# Patient Record
Sex: MALE | Race: WHITE | NOT HISPANIC OR LATINO | Employment: OTHER | URBAN - METROPOLITAN AREA
[De-identification: names, ages, dates, MRNs, and addresses within clinical notes are randomized per-mention and may not be internally consistent; named-entity substitution may affect disease eponyms.]

---

## 2024-05-31 ENCOUNTER — APPOINTMENT (OUTPATIENT)
Dept: CARDIOLOGY | Facility: HOSPITAL | Age: 89
DRG: 190 | End: 2024-05-31
Payer: MEDICARE

## 2024-05-31 ENCOUNTER — HOSPITAL ENCOUNTER (INPATIENT)
Facility: HOSPITAL | Age: 89
LOS: 2 days | Discharge: HOME | DRG: 190 | End: 2024-06-02
Attending: EMERGENCY MEDICINE | Admitting: INTERNAL MEDICINE
Payer: MEDICARE

## 2024-05-31 ENCOUNTER — APPOINTMENT (OUTPATIENT)
Dept: RADIOLOGY | Facility: HOSPITAL | Age: 89
DRG: 190 | End: 2024-05-31
Payer: MEDICARE

## 2024-05-31 DIAGNOSIS — R79.89 ELEVATED BRAIN NATRIURETIC PEPTIDE (BNP) LEVEL: ICD-10-CM

## 2024-05-31 DIAGNOSIS — J96.01 ACUTE RESPIRATORY FAILURE WITH HYPOXIA AND HYPERCAPNIA (MULTI): ICD-10-CM

## 2024-05-31 DIAGNOSIS — J96.02 ACUTE RESPIRATORY FAILURE WITH HYPOXIA AND HYPERCAPNIA (MULTI): ICD-10-CM

## 2024-05-31 DIAGNOSIS — R94.31 ABNORMAL ELECTROCARDIOGRAM (ECG) (EKG): ICD-10-CM

## 2024-05-31 DIAGNOSIS — J44.1 COPD EXACERBATION (MULTI): Primary | ICD-10-CM

## 2024-05-31 LAB
ALBUMIN SERPL BCP-MCNC: 3.6 G/DL (ref 3.4–5)
ALP SERPL-CCNC: 112 U/L (ref 33–136)
ALT SERPL W P-5'-P-CCNC: 11 U/L (ref 10–52)
ANION GAP BLDV CALCULATED.4IONS-SCNC: 12 MMOL/L (ref 10–25)
ANION GAP BLDV CALCULATED.4IONS-SCNC: 12 MMOL/L (ref 10–25)
ANION GAP SERPL CALC-SCNC: 12 MMOL/L (ref 10–20)
AST SERPL W P-5'-P-CCNC: 18 U/L (ref 9–39)
BASE EXCESS BLDV CALC-SCNC: 5.7 MMOL/L (ref -2–3)
BASE EXCESS BLDV CALC-SCNC: 6.1 MMOL/L (ref -2–3)
BASOPHILS # BLD AUTO: 0.02 X10*3/UL (ref 0–0.1)
BASOPHILS NFR BLD AUTO: 0.3 %
BILIRUB SERPL-MCNC: 0.4 MG/DL (ref 0–1.2)
BNP SERPL-MCNC: 739 PG/ML (ref 0–99)
BODY TEMPERATURE: 37 DEGREES CELSIUS
BODY TEMPERATURE: 37 DEGREES CELSIUS
BUN SERPL-MCNC: 49 MG/DL (ref 6–23)
CA-I BLDV-SCNC: 1.18 MMOL/L (ref 1.1–1.33)
CA-I BLDV-SCNC: 1.19 MMOL/L (ref 1.1–1.33)
CALCIUM SERPL-MCNC: 8.9 MG/DL (ref 8.6–10.3)
CARDIAC TROPONIN I PNL SERPL HS: 350 NG/L (ref 0–20)
CARDIAC TROPONIN I PNL SERPL HS: 36 NG/L (ref 0–20)
CARDIAC TROPONIN I PNL SERPL HS: 48 NG/L (ref 0–20)
CHLORIDE BLDV-SCNC: 96 MMOL/L (ref 98–107)
CHLORIDE BLDV-SCNC: 97 MMOL/L (ref 98–107)
CHLORIDE SERPL-SCNC: 99 MMOL/L (ref 98–107)
CO2 SERPL-SCNC: 33 MMOL/L (ref 21–32)
CREAT SERPL-MCNC: 1.59 MG/DL (ref 0.5–1.3)
CRITICAL CALL TIME: 218
CRITICAL CALL TIME: 50
CRITICAL CALLED BY: ABNORMAL
CRITICAL CALLED BY: ABNORMAL
CRITICAL CALLED TO: ABNORMAL
CRITICAL CALLED TO: ABNORMAL
CRITICAL READ BACK: ABNORMAL
CRITICAL READ BACK: ABNORMAL
EGFRCR SERPLBLD CKD-EPI 2021: 41 ML/MIN/1.73M*2
EOSINOPHIL # BLD AUTO: 0.56 X10*3/UL (ref 0–0.4)
EOSINOPHIL NFR BLD AUTO: 7.3 %
ERYTHROCYTE [DISTWIDTH] IN BLOOD BY AUTOMATED COUNT: 15.3 % (ref 11.5–14.5)
GLUCOSE BLDV-MCNC: 125 MG/DL (ref 74–99)
GLUCOSE BLDV-MCNC: 141 MG/DL (ref 74–99)
GLUCOSE SERPL-MCNC: 137 MG/DL (ref 74–99)
HCO3 BLDV-SCNC: 34.3 MMOL/L (ref 22–26)
HCO3 BLDV-SCNC: 35.5 MMOL/L (ref 22–26)
HCT VFR BLD AUTO: 36.7 % (ref 41–52)
HCT VFR BLD EST: 32 % (ref 41–52)
HCT VFR BLD EST: 35 % (ref 41–52)
HGB BLD-MCNC: 11.7 G/DL (ref 13.5–17.5)
HGB BLDV-MCNC: 10.8 G/DL (ref 13.5–17.5)
HGB BLDV-MCNC: 11.5 G/DL (ref 13.5–17.5)
IMM GRANULOCYTES # BLD AUTO: 0.02 X10*3/UL (ref 0–0.5)
IMM GRANULOCYTES NFR BLD AUTO: 0.3 % (ref 0–0.9)
INHALED O2 CONCENTRATION: 40 %
INHALED O2 CONCENTRATION: 50 %
LACTATE BLDV-SCNC: 1.3 MMOL/L (ref 0.4–2)
LACTATE BLDV-SCNC: 1.3 MMOL/L (ref 0.4–2)
LYMPHOCYTES # BLD AUTO: 2.11 X10*3/UL (ref 0.8–3)
LYMPHOCYTES NFR BLD AUTO: 27.6 %
MAGNESIUM SERPL-MCNC: 2.21 MG/DL (ref 1.6–2.4)
MCH RBC QN AUTO: 31.4 PG (ref 26–34)
MCHC RBC AUTO-ENTMCNC: 31.9 G/DL (ref 32–36)
MCV RBC AUTO: 98 FL (ref 80–100)
MONOCYTES # BLD AUTO: 0.91 X10*3/UL (ref 0.05–0.8)
MONOCYTES NFR BLD AUTO: 11.9 %
NEUTROPHILS # BLD AUTO: 4.02 X10*3/UL (ref 1.6–5.5)
NEUTROPHILS NFR BLD AUTO: 52.6 %
NRBC BLD-RTO: 0 /100 WBCS (ref 0–0)
OXYHGB MFR BLDV: 23 % (ref 45–75)
OXYHGB MFR BLDV: 34.5 % (ref 45–75)
PCO2 BLDV: 73 MM HG (ref 41–51)
PCO2 BLDV: 79 MM HG (ref 41–51)
PH BLDV: 7.26 PH (ref 7.33–7.43)
PH BLDV: 7.28 PH (ref 7.33–7.43)
PLATELET # BLD AUTO: 151 X10*3/UL (ref 150–450)
PO2 BLDV: 20 MM HG (ref 35–45)
PO2 BLDV: 26 MM HG (ref 35–45)
POTASSIUM BLDV-SCNC: 4.6 MMOL/L (ref 3.5–5.3)
POTASSIUM BLDV-SCNC: 4.6 MMOL/L (ref 3.5–5.3)
POTASSIUM SERPL-SCNC: 4.4 MMOL/L (ref 3.5–5.3)
PROT SERPL-MCNC: 6.7 G/DL (ref 6.4–8.2)
RBC # BLD AUTO: 3.73 X10*6/UL (ref 4.5–5.9)
SAO2 % BLDV: 23 % (ref 45–75)
SAO2 % BLDV: 35 % (ref 45–75)
SARS-COV-2 RNA RESP QL NAA+PROBE: NOT DETECTED
SODIUM BLDV-SCNC: 139 MMOL/L (ref 136–145)
SODIUM BLDV-SCNC: 139 MMOL/L (ref 136–145)
SODIUM SERPL-SCNC: 140 MMOL/L (ref 136–145)
WBC # BLD AUTO: 7.6 X10*3/UL (ref 4.4–11.3)

## 2024-05-31 PROCEDURE — 94640 AIRWAY INHALATION TREATMENT: CPT

## 2024-05-31 PROCEDURE — 93005 ELECTROCARDIOGRAM TRACING: CPT

## 2024-05-31 PROCEDURE — 71045 X-RAY EXAM CHEST 1 VIEW: CPT | Mod: FOREIGN READ | Performed by: RADIOLOGY

## 2024-05-31 PROCEDURE — 94660 CPAP INITIATION&MGMT: CPT

## 2024-05-31 PROCEDURE — 36415 COLL VENOUS BLD VENIPUNCTURE: CPT | Performed by: EMERGENCY MEDICINE

## 2024-05-31 PROCEDURE — 2500000002 HC RX 250 W HCPCS SELF ADMINISTERED DRUGS (ALT 637 FOR MEDICARE OP, ALT 636 FOR OP/ED): Performed by: EMERGENCY MEDICINE

## 2024-05-31 PROCEDURE — 99291 CRITICAL CARE FIRST HOUR: CPT | Mod: 25 | Performed by: EMERGENCY MEDICINE

## 2024-05-31 PROCEDURE — 36415 COLL VENOUS BLD VENIPUNCTURE: CPT

## 2024-05-31 PROCEDURE — 71045 X-RAY EXAM CHEST 1 VIEW: CPT

## 2024-05-31 PROCEDURE — 2500000002 HC RX 250 W HCPCS SELF ADMINISTERED DRUGS (ALT 637 FOR MEDICARE OP, ALT 636 FOR OP/ED)

## 2024-05-31 PROCEDURE — 96365 THER/PROPH/DIAG IV INF INIT: CPT

## 2024-05-31 PROCEDURE — 84132 ASSAY OF SERUM POTASSIUM: CPT | Mod: 91 | Performed by: EMERGENCY MEDICINE

## 2024-05-31 PROCEDURE — 2500000004 HC RX 250 GENERAL PHARMACY W/ HCPCS (ALT 636 FOR OP/ED)

## 2024-05-31 PROCEDURE — 2500000002 HC RX 250 W HCPCS SELF ADMINISTERED DRUGS (ALT 637 FOR MEDICARE OP, ALT 636 FOR OP/ED): Mod: MUE

## 2024-05-31 PROCEDURE — 84484 ASSAY OF TROPONIN QUANT: CPT | Performed by: EMERGENCY MEDICINE

## 2024-05-31 PROCEDURE — 84484 ASSAY OF TROPONIN QUANT: CPT

## 2024-05-31 PROCEDURE — 96367 TX/PROPH/DG ADDL SEQ IV INF: CPT

## 2024-05-31 PROCEDURE — 83880 ASSAY OF NATRIURETIC PEPTIDE: CPT | Performed by: EMERGENCY MEDICINE

## 2024-05-31 PROCEDURE — 2500000001 HC RX 250 WO HCPCS SELF ADMINISTERED DRUGS (ALT 637 FOR MEDICARE OP)

## 2024-05-31 PROCEDURE — 2060000001 HC INTERMEDIATE ICU ROOM DAILY

## 2024-05-31 PROCEDURE — 87635 SARS-COV-2 COVID-19 AMP PRB: CPT | Performed by: EMERGENCY MEDICINE

## 2024-05-31 PROCEDURE — 83735 ASSAY OF MAGNESIUM: CPT | Performed by: EMERGENCY MEDICINE

## 2024-05-31 PROCEDURE — 80053 COMPREHEN METABOLIC PANEL: CPT | Performed by: EMERGENCY MEDICINE

## 2024-05-31 PROCEDURE — 2500000004 HC RX 250 GENERAL PHARMACY W/ HCPCS (ALT 636 FOR OP/ED): Performed by: EMERGENCY MEDICINE

## 2024-05-31 PROCEDURE — 85025 COMPLETE CBC W/AUTO DIFF WBC: CPT | Performed by: EMERGENCY MEDICINE

## 2024-05-31 RX ORDER — CEFTRIAXONE 1 G/50ML
1 INJECTION, SOLUTION INTRAVENOUS ONCE
Status: COMPLETED | OUTPATIENT
Start: 2024-05-31 | End: 2024-05-31

## 2024-05-31 RX ORDER — ASPIRIN 81 MG/1
81 TABLET ORAL DAILY
Status: DISCONTINUED | OUTPATIENT
Start: 2024-05-31 | End: 2024-06-02 | Stop reason: HOSPADM

## 2024-05-31 RX ORDER — ALBUTEROL SULFATE 90 UG/1
2 AEROSOL, METERED RESPIRATORY (INHALATION) EVERY 6 HOURS PRN
COMMUNITY
Start: 2023-08-01

## 2024-05-31 RX ORDER — ASPIRIN 81 MG/1
1 TABLET ORAL DAILY
COMMUNITY

## 2024-05-31 RX ORDER — ATORVASTATIN CALCIUM 40 MG/1
40 TABLET, FILM COATED ORAL NIGHTLY
Status: DISCONTINUED | OUTPATIENT
Start: 2024-05-31 | End: 2024-06-02 | Stop reason: HOSPADM

## 2024-05-31 RX ORDER — POLYETHYLENE GLYCOL 3350 17 G/17G
17 POWDER, FOR SOLUTION ORAL DAILY
Status: DISCONTINUED | OUTPATIENT
Start: 2024-05-31 | End: 2024-06-02 | Stop reason: HOSPADM

## 2024-05-31 RX ORDER — METOPROLOL TARTRATE 25 MG/1
25 TABLET, FILM COATED ORAL 2 TIMES DAILY
Status: DISCONTINUED | OUTPATIENT
Start: 2024-05-31 | End: 2024-06-02 | Stop reason: HOSPADM

## 2024-05-31 RX ORDER — PETROLATUM 420 MG/G
OINTMENT TOPICAL DAILY
Status: DISCONTINUED | OUTPATIENT
Start: 2024-05-31 | End: 2024-06-02 | Stop reason: HOSPADM

## 2024-05-31 RX ORDER — MAGNESIUM SULFATE HEPTAHYDRATE 40 MG/ML
2 INJECTION, SOLUTION INTRAVENOUS ONCE
Status: COMPLETED | OUTPATIENT
Start: 2024-05-31 | End: 2024-05-31

## 2024-05-31 RX ORDER — ASPIRIN 325 MG
50000 TABLET, DELAYED RELEASE (ENTERIC COATED) ORAL
COMMUNITY
Start: 2023-12-28

## 2024-05-31 RX ORDER — IPRATROPIUM BROMIDE AND ALBUTEROL SULFATE 2.5; .5 MG/3ML; MG/3ML
3 SOLUTION RESPIRATORY (INHALATION)
Status: DISCONTINUED | OUTPATIENT
Start: 2024-05-31 | End: 2024-06-01

## 2024-05-31 RX ORDER — IPRATROPIUM BROMIDE AND ALBUTEROL SULFATE 2.5; .5 MG/3ML; MG/3ML
3 SOLUTION RESPIRATORY (INHALATION) ONCE
Status: COMPLETED | OUTPATIENT
Start: 2024-05-31 | End: 2024-05-31

## 2024-05-31 RX ORDER — GUAIFENESIN 100 MG/5ML
400 SOLUTION ORAL EVERY 4 HOURS PRN
Status: DISCONTINUED | OUTPATIENT
Start: 2024-05-31 | End: 2024-06-02 | Stop reason: HOSPADM

## 2024-05-31 RX ORDER — FLUTICASONE PROPIONATE 50 MCG
1 SPRAY, SUSPENSION (ML) NASAL DAILY PRN
Status: DISCONTINUED | OUTPATIENT
Start: 2024-05-31 | End: 2024-06-02 | Stop reason: HOSPADM

## 2024-05-31 RX ORDER — HEPARIN SODIUM 5000 [USP'U]/ML
5000 INJECTION, SOLUTION INTRAVENOUS; SUBCUTANEOUS EVERY 8 HOURS
Status: DISCONTINUED | OUTPATIENT
Start: 2024-05-31 | End: 2024-06-02 | Stop reason: HOSPADM

## 2024-05-31 RX ORDER — ALBUTEROL SULFATE 0.83 MG/ML
2.5 SOLUTION RESPIRATORY (INHALATION) ONCE
Status: COMPLETED | OUTPATIENT
Start: 2024-05-31 | End: 2024-05-31

## 2024-05-31 RX ORDER — L. ACIDOPHILUS/L.BULGARICUS 1MM CELL
1 TABLET ORAL 2 TIMES DAILY
Status: DISCONTINUED | OUTPATIENT
Start: 2024-05-31 | End: 2024-06-02 | Stop reason: HOSPADM

## 2024-05-31 RX ORDER — ALBUTEROL SULFATE 90 UG/1
2 AEROSOL, METERED RESPIRATORY (INHALATION) EVERY 6 HOURS PRN
Status: CANCELLED | OUTPATIENT
Start: 2024-05-31

## 2024-05-31 RX ORDER — ALBUTEROL SULFATE 0.83 MG/ML
2.5 SOLUTION RESPIRATORY (INHALATION) EVERY 4 HOURS PRN
Status: CANCELLED | OUTPATIENT
Start: 2024-05-31

## 2024-05-31 RX ORDER — METOPROLOL TARTRATE 25 MG/1
1 TABLET, FILM COATED ORAL 2 TIMES DAILY
COMMUNITY
Start: 2024-01-29

## 2024-05-31 RX ORDER — FLUTICASONE PROPIONATE 50 MCG
1 SPRAY, SUSPENSION (ML) NASAL DAILY PRN
COMMUNITY

## 2024-05-31 RX ORDER — GUAIFENESIN 400 MG/1
400 TABLET ORAL EVERY 4 HOURS PRN
COMMUNITY
Start: 2024-04-09

## 2024-05-31 RX ORDER — ALLOPURINOL 100 MG/1
100 TABLET ORAL DAILY
Status: DISCONTINUED | OUTPATIENT
Start: 2024-05-31 | End: 2024-06-02 | Stop reason: HOSPADM

## 2024-05-31 RX ORDER — ATORVASTATIN CALCIUM 80 MG/1
0.5 TABLET, FILM COATED ORAL NIGHTLY
COMMUNITY
Start: 2023-06-28

## 2024-05-31 RX ORDER — ACETAMINOPHEN 325 MG/1
650 TABLET ORAL EVERY 4 HOURS PRN
Status: DISCONTINUED | OUTPATIENT
Start: 2024-05-31 | End: 2024-06-02 | Stop reason: HOSPADM

## 2024-05-31 RX ORDER — TORSEMIDE 20 MG/1
20 TABLET ORAL DAILY PRN
Status: DISCONTINUED | OUTPATIENT
Start: 2024-05-31 | End: 2024-06-02 | Stop reason: HOSPADM

## 2024-05-31 RX ORDER — ALBUTEROL SULFATE 0.83 MG/ML
2.5 SOLUTION RESPIRATORY (INHALATION) EVERY 4 HOURS PRN
Status: DISCONTINUED | OUTPATIENT
Start: 2024-05-31 | End: 2024-06-02 | Stop reason: HOSPADM

## 2024-05-31 RX ORDER — TORSEMIDE 20 MG/1
1-2 TABLET ORAL DAILY PRN
COMMUNITY
Start: 2023-12-11

## 2024-05-31 RX ORDER — ALLOPURINOL 300 MG/1
1 TABLET ORAL DAILY
COMMUNITY
Start: 2022-09-27

## 2024-05-31 RX ADMIN — DOXYCYCLINE 100 MG: 100 INJECTION, POWDER, LYOPHILIZED, FOR SOLUTION INTRAVENOUS at 03:35

## 2024-05-31 RX ADMIN — METHYLPREDNISOLONE SODIUM SUCCINATE 40 MG: 40 INJECTION, POWDER, FOR SOLUTION INTRAMUSCULAR; INTRAVENOUS at 12:42

## 2024-05-31 RX ADMIN — METOPROLOL TARTRATE 25 MG: 25 TABLET, FILM COATED ORAL at 20:56

## 2024-05-31 RX ADMIN — Medication 1 TABLET: at 12:42

## 2024-05-31 RX ADMIN — ATORVASTATIN CALCIUM 40 MG: 40 TABLET, FILM COATED ORAL at 20:56

## 2024-05-31 RX ADMIN — ALLOPURINOL 100 MG: 100 TABLET ORAL at 13:29

## 2024-05-31 RX ADMIN — AZITHROMYCIN MONOHYDRATE 500 MG: 500 INJECTION, POWDER, LYOPHILIZED, FOR SOLUTION INTRAVENOUS at 12:42

## 2024-05-31 RX ADMIN — WHITE PETROLATUM: 1.75 OINTMENT TOPICAL at 13:29

## 2024-05-31 RX ADMIN — IPRATROPIUM BROMIDE AND ALBUTEROL SULFATE 3 ML: .5; 3 SOLUTION RESPIRATORY (INHALATION) at 19:15

## 2024-05-31 RX ADMIN — METOPROLOL TARTRATE 25 MG: 25 TABLET, FILM COATED ORAL at 12:42

## 2024-05-31 RX ADMIN — ALBUTEROL SULFATE 2.5 MG: 2.5 SOLUTION RESPIRATORY (INHALATION) at 00:57

## 2024-05-31 RX ADMIN — HEPARIN SODIUM 5000 UNITS: 5000 INJECTION INTRAVENOUS; SUBCUTANEOUS at 12:42

## 2024-05-31 RX ADMIN — METHYLPREDNISOLONE SODIUM SUCCINATE 40 MG: 40 INJECTION, POWDER, FOR SOLUTION INTRAMUSCULAR; INTRAVENOUS at 20:56

## 2024-05-31 RX ADMIN — CEFTRIAXONE SODIUM 1 G: 1 INJECTION, SOLUTION INTRAVENOUS at 02:54

## 2024-05-31 RX ADMIN — MAGNESIUM SULFATE HEPTAHYDRATE 2 G: 40 INJECTION, SOLUTION INTRAVENOUS at 01:00

## 2024-05-31 RX ADMIN — IPRATROPIUM BROMIDE AND ALBUTEROL SULFATE 3 ML: .5; 3 SOLUTION RESPIRATORY (INHALATION) at 12:35

## 2024-05-31 RX ADMIN — Medication 1 TABLET: at 20:56

## 2024-05-31 RX ADMIN — HEPARIN SODIUM 5000 UNITS: 5000 INJECTION INTRAVENOUS; SUBCUTANEOUS at 20:56

## 2024-05-31 RX ADMIN — IPRATROPIUM BROMIDE AND ALBUTEROL SULFATE 3 ML: .5; 3 SOLUTION RESPIRATORY (INHALATION) at 03:35

## 2024-05-31 RX ADMIN — ASPIRIN 81 MG: 81 TABLET, COATED ORAL at 12:42

## 2024-05-31 RX ADMIN — IPRATROPIUM BROMIDE AND ALBUTEROL SULFATE 3 ML: .5; 3 SOLUTION RESPIRATORY (INHALATION) at 00:57

## 2024-05-31 SDOH — SOCIAL STABILITY: SOCIAL INSECURITY: DO YOU FEEL ANYONE HAS EXPLOITED OR TAKEN ADVANTAGE OF YOU FINANCIALLY OR OF YOUR PERSONAL PROPERTY?: NO

## 2024-05-31 SDOH — SOCIAL STABILITY: SOCIAL INSECURITY: ARE YOU OR HAVE YOU BEEN THREATENED OR ABUSED PHYSICALLY, EMOTIONALLY, OR SEXUALLY BY ANYONE?: NO

## 2024-05-31 SDOH — SOCIAL STABILITY: SOCIAL INSECURITY: HAS ANYONE EVER THREATENED TO HURT YOUR FAMILY OR YOUR PETS?: NO

## 2024-05-31 SDOH — SOCIAL STABILITY: SOCIAL INSECURITY: ABUSE: ADULT

## 2024-05-31 SDOH — SOCIAL STABILITY: SOCIAL INSECURITY: HAVE YOU HAD THOUGHTS OF HARMING ANYONE ELSE?: NO

## 2024-05-31 SDOH — SOCIAL STABILITY: SOCIAL INSECURITY: WERE YOU ABLE TO COMPLETE ALL THE BEHAVIORAL HEALTH SCREENINGS?: YES

## 2024-05-31 SDOH — SOCIAL STABILITY: SOCIAL INSECURITY: DOES ANYONE TRY TO KEEP YOU FROM HAVING/CONTACTING OTHER FRIENDS OR DOING THINGS OUTSIDE YOUR HOME?: NO

## 2024-05-31 SDOH — SOCIAL STABILITY: SOCIAL INSECURITY: ARE THERE ANY APPARENT SIGNS OF INJURIES/BEHAVIORS THAT COULD BE RELATED TO ABUSE/NEGLECT?: NO

## 2024-05-31 SDOH — SOCIAL STABILITY: SOCIAL INSECURITY: HAVE YOU HAD ANY THOUGHTS OF HARMING ANYONE ELSE?: NO

## 2024-05-31 SDOH — SOCIAL STABILITY: SOCIAL INSECURITY: DO YOU FEEL UNSAFE GOING BACK TO THE PLACE WHERE YOU ARE LIVING?: NO

## 2024-05-31 ASSESSMENT — ACTIVITIES OF DAILY LIVING (ADL)
ASSISTIVE_DEVICE: WALKER;CANE
GROOMING: INDEPENDENT
LACK_OF_TRANSPORTATION: NO
HEARING - LEFT EAR: FUNCTIONAL
TOILETING: NEEDS ASSISTANCE
WALKS IN HOME: NEEDS ASSISTANCE
ADEQUATE_TO_COMPLETE_ADL: YES
BATHING: NEEDS ASSISTANCE
DRESSING YOURSELF: NEEDS ASSISTANCE
HEARING - RIGHT EAR: FUNCTIONAL
PATIENT'S MEMORY ADEQUATE TO SAFELY COMPLETE DAILY ACTIVITIES?: YES
FEEDING YOURSELF: INDEPENDENT
JUDGMENT_ADEQUATE_SAFELY_COMPLETE_DAILY_ACTIVITIES: YES

## 2024-05-31 ASSESSMENT — LIFESTYLE VARIABLES
HOW OFTEN DO YOU HAVE 6 OR MORE DRINKS ON ONE OCCASION: NEVER
HOW MANY STANDARD DRINKS CONTAINING ALCOHOL DO YOU HAVE ON A TYPICAL DAY: PATIENT DOES NOT DRINK
EVER HAD A DRINK FIRST THING IN THE MORNING TO STEADY YOUR NERVES TO GET RID OF A HANGOVER: NO
TOTAL SCORE: 0
AUDIT-C TOTAL SCORE: 0
PRESCIPTION_ABUSE_PAST_12_MONTHS: NO
SKIP TO QUESTIONS 9-10: 1
EVER FELT BAD OR GUILTY ABOUT YOUR DRINKING: NO
HOW OFTEN DO YOU HAVE A DRINK CONTAINING ALCOHOL: NEVER
HAVE PEOPLE ANNOYED YOU BY CRITICIZING YOUR DRINKING: NO
SUBSTANCE_ABUSE_PAST_12_MONTHS: NO
AUDIT-C TOTAL SCORE: 0
HAVE YOU EVER FELT YOU SHOULD CUT DOWN ON YOUR DRINKING: NO

## 2024-05-31 ASSESSMENT — ENCOUNTER SYMPTOMS
RHINORRHEA: 0
SORE THROAT: 0
VOMITING: 0
HEADACHES: 0
DIAPHORESIS: 0
NAUSEA: 0
APPETITE CHANGE: 0
DIFFICULTY URINATING: 0
DYSURIA: 0
COUGH: 1
CHILLS: 1
WEAKNESS: 0
CONSTIPATION: 0
DIARRHEA: 0
DIZZINESS: 0
FEVER: 0
SHORTNESS OF BREATH: 1
PALPITATIONS: 0
LIGHT-HEADEDNESS: 0
WHEEZING: 1
NUMBNESS: 0
ABDOMINAL PAIN: 0
ABDOMINAL DISTENTION: 0

## 2024-05-31 ASSESSMENT — COLUMBIA-SUICIDE SEVERITY RATING SCALE - C-SSRS
2. HAVE YOU ACTUALLY HAD ANY THOUGHTS OF KILLING YOURSELF?: NO
6. HAVE YOU EVER DONE ANYTHING, STARTED TO DO ANYTHING, OR PREPARED TO DO ANYTHING TO END YOUR LIFE?: NO
2. HAVE YOU ACTUALLY HAD ANY THOUGHTS OF KILLING YOURSELF?: NO
1. IN THE PAST MONTH, HAVE YOU WISHED YOU WERE DEAD OR WISHED YOU COULD GO TO SLEEP AND NOT WAKE UP?: NO
1. IN THE PAST MONTH, HAVE YOU WISHED YOU WERE DEAD OR WISHED YOU COULD GO TO SLEEP AND NOT WAKE UP?: NO
6. HAVE YOU EVER DONE ANYTHING, STARTED TO DO ANYTHING, OR PREPARED TO DO ANYTHING TO END YOUR LIFE?: NO

## 2024-05-31 ASSESSMENT — PATIENT HEALTH QUESTIONNAIRE - PHQ9
2. FEELING DOWN, DEPRESSED OR HOPELESS: NOT AT ALL
SUM OF ALL RESPONSES TO PHQ9 QUESTIONS 1 & 2: 0
1. LITTLE INTEREST OR PLEASURE IN DOING THINGS: NOT AT ALL

## 2024-05-31 ASSESSMENT — COGNITIVE AND FUNCTIONAL STATUS - GENERAL
MOVING TO AND FROM BED TO CHAIR: A LITTLE
TURNING FROM BACK TO SIDE WHILE IN FLAT BAD: A LITTLE
DAILY ACTIVITIY SCORE: 21
WALKING IN HOSPITAL ROOM: A LITTLE
DRESSING REGULAR LOWER BODY CLOTHING: A LITTLE
PATIENT BASELINE BEDBOUND: NO
STANDING UP FROM CHAIR USING ARMS: A LITTLE
MOBILITY SCORE: 18
TOILETING: A LITTLE
CLIMB 3 TO 5 STEPS WITH RAILING: A LOT
HELP NEEDED FOR BATHING: A LITTLE

## 2024-05-31 ASSESSMENT — PAIN SCALES - GENERAL: PAINLEVEL_OUTOF10: 0 - NO PAIN

## 2024-05-31 ASSESSMENT — PAIN - FUNCTIONAL ASSESSMENT: PAIN_FUNCTIONAL_ASSESSMENT: 0-10

## 2024-05-31 NOTE — ED PROCEDURE NOTE
Procedure  Critical Care    Performed by: Shai Viveros MD  Authorized by: Shai Viveros MD    Critical care provider statement:     Critical care time (minutes):  35    Critical care time was exclusive of:  Separately billable procedures and treating other patients and teaching time    Critical care was necessary to treat or prevent imminent or life-threatening deterioration of the following conditions:  Respiratory failure    Critical care was time spent personally by me on the following activities:  Ordering and performing treatments and interventions, ordering and review of laboratory studies, ordering and review of radiographic studies, re-evaluation of patient's condition, review of old charts, examination of patient, discussions with primary provider, discussions with consultants and evaluation of patient's response to treatment    Care discussed with: admitting provider                 Shai Viveros MD  05/31/24 3086

## 2024-05-31 NOTE — ED PROVIDER NOTES
HPI   Chief Complaint   Patient presents with    Shortness of Breath     PT. ARRIVED VIA EMS TO ED FROM HOME FOR SOB. PT. STATES AROUND 2030 BECOMING SHORT OF BREATH, PUT HIMSELF ON HIS CPAP MACHINE W/O RELIEF, PT. NORMALLY ON 2L NC AT HOME. PER EMS REPORT, PT. 69% ON 4L NC UPON ARRIVAL. PT ARRIVED ON CPAP, GIVEN X1 DUO NEB, AND SOLUMEDROL IN ROUTE, OXYGEN SATURATION 94%.       89-year-old male past medical history of hypertension hyperlipidemia COPD on 2 L at night HFpEF who presents today for respiratory distress.  Patient states that he has been feeling a little bit more short of breath over the last 2 to 3 days, but then became acutely short of breath at around 2030 tonight, he did attempt to use his CPAP, but was unsuccessful.  At this time he called 911.  He did receive steroids as well as a single DuoNeb prior to arrival.  He denies any chest pain, but does feel short of breath. They did place him on CPAP, and he felt that this helped a lot.  He denies any new leg swelling, nausea vomiting diarrhea.  Denies any fever cough sore throat.  Denies any changes in medication, facial swelling.      History provided by:  Patient and EMS personnel  History limited by:  Severe respiratory distress   used: No                        Jnenie Coma Scale Score: 15                     Patient History   No past medical history on file.  Past Surgical History:   Procedure Laterality Date    CT ABDOMEN PELVIS ANGIOGRAM W AND/OR WO IV CONTRAST  1/3/2023    CT ABDOMEN PELVIS ANGIOGRAM W AND/OR WO IV CONTRAST 1/3/2023 [facility]     No family history on file.  Social History     Tobacco Use    Smoking status: Not on file    Smokeless tobacco: Not on file   Substance Use Topics    Alcohol use: Not on file    Drug use: Not on file       Physical Exam   ED Triage Vitals [05/31/24 0036]   Temp Heart Rate Respirations BP   -- (!) 108 (!) 24 (!) 141/93      Pulse Ox Temp src Heart Rate Source Patient  Position   96 % -- Monitor Sitting      BP Location FiO2 (%)     Right arm --       Physical Exam  Vitals and nursing note reviewed.   Constitutional:       General: He is in acute distress.      Appearance: He is well-developed.   HENT:      Head: Normocephalic and atraumatic.      Comments: BiPAP mask in place, limited evaluation of oropharynx 2/2 this  Eyes:      Conjunctiva/sclera: Conjunctivae normal.   Cardiovascular:      Rate and Rhythm: Regular rhythm. Tachycardia present.      Heart sounds: No murmur heard.     No friction rub.      Comments: Palpable DP and PT pulses, radial pulses bilaterally.  Pulmonary:      Effort: Tachypnea and accessory muscle usage present. No respiratory distress.      Breath sounds: Examination of the right-upper field reveals decreased breath sounds. Examination of the left-upper field reveals decreased breath sounds. Examination of the right-lower field reveals decreased breath sounds. Examination of the left-lower field reveals decreased breath sounds. Decreased breath sounds present. No wheezing, rhonchi or rales.   Chest:      Chest wall: No tenderness or edema.   Abdominal:      Palpations: Abdomen is soft.      Tenderness: There is no abdominal tenderness.   Musculoskeletal:         General: No swelling. Normal range of motion.      Cervical back: Neck supple.      Right lower leg: No tenderness. No edema.      Left lower leg: No tenderness. No edema.   Skin:     General: Skin is warm and dry.      Capillary Refill: Capillary refill takes less than 2 seconds.      Coloration: Skin is pale. Skin is not cyanotic.      Findings: No erythema or rash.   Neurological:      General: No focal deficit present.      Mental Status: He is alert and oriented to person, place, and time.   Psychiatric:         Mood and Affect: Mood normal.         ED Course & MDM   ED Course as of 05/31/24 0325   Fri May 31, 2024   0244 EKG obtained interpreted by me.  Atrial fibrillation.  Rate of 100.   Mild lateral ST depression.  No STEMI.  QT mildly prolonged. [MK]      ED Course User Index  [MK] Shai Viveros MD         Diagnoses as of 05/31/24 0325   COPD exacerbation (Multi)   Acute respiratory failure with hypoxia and hypercapnia (Multi)       Medical Decision Making  89-year-old male past medical history of hypertension hyperlipidemia COPD 2 L at night and HFpEF who presents today for respiratory distress.  Patient initially improved on PPV after some breathing treatments with EMS, so we will place him on BiPAP here given additional breathing treatments as well as magnesium. Will also get baseline labs on him to assess for white count or potential etiology for shortness of breath.  Given the patient's need for increased positive pressure ventilation, anticipate he would likely need admitted.  Patient's initial gas showed a pH of 7.26 with pCO2 of 79 with adequate oxygenation. Given this, we will continue his BiPAP and assess a gas later on.  Patient symptomatically improved after roughly an hour of BiPAP, however, gas did demonstrate mild improvement in his pH with a small change in his pCO2.  Given this, not sure he is appropriate to be weaned off of BiPAP at this time.  However, given the fact that he appears to be clinically improving, we will admit him to the stepdown unit.  We did discuss him with the ICU team, who accepted him to stepdown unit.  Plan discussed with patient and family, comfortable with plan at this time.        Procedure  Procedures     Antony Deluna MD  Resident  05/31/24 0329

## 2024-05-31 NOTE — H&P
"History Of Present Illness  Donnie Sims is a 89 y.o. male with a past medical history of HFpEF, paroxysmal atrial fibrillation, hypertension, hyperlipidemia, COPD on 2 L at night, chronic respiratory failure, QUENTIN (on CPAP at night), BPH who presents today in the setting of respiratory distress.  Patient states he has been feeling more short of breath over the last 2-3 days, but then became acutely short of breath at around 10:30 PM last night, 5/30.  He attempted to use his CPAP, which he tells me did help \"a little bit,\" but his breathing still felt strange to him, so his wife called EMS.  He tells me his wife make sure he takes his medications daily, and denies any recent changes to his medications.  He has not taken his medications yet today.  He denies recent travel, hormone use, current tobacco use, history of DVT/PE.  He endorses chills at home, productive cough of \"silver\" phlegm and does have to elevate his head when he sleeps at night, but denies fevers, headache, dizziness, weakness, vision changes, sore throat, rhinorrhea, congestion chest pain, palpitations, leg swelling, abdominal pain, nausea/vomiting, diarrhea, numbness/tingling, urinary symptoms.  He lives with his wife and uses a walker to get around. Of note, on arrival of EMS, patient was saturating 69% on 4L NC. Patient arrived to ED on CPAP, and was given 1 douneb and solumedrol en route. Oxygen saturation then came up to 94%. I did discuss code status with patient, and he tells me he is wanting to be a DNR-CCA but does not want to fill out the paperwork without his wife present, so he will be full code until his wife arrives and they can have the discussion together.     ED Course: On arrival, patient's /93, heart rate 108, respirations 24, saturating 96% on BiPAP.  Patient's heart rate did go down to 93, but is now up to 116. Patient symptomatically improved after roughly an hour of BiPAP, however, gas did demonstrate mild " improvement in his pH with a small change in his pCO2. Labs and imaging performed, revealing bicarb 33, creatinine 1.59 (1.32 on 1/6/23), LFTs normal, magnesium normal,  (637 on 1/3/23), initial troponin 36, repeat troponin 48, no leukocytosis, hemoglobin 11.7 (9.6 on 1/6/23).  COVID swab negative.  Initial blood gas shows pH 7.26, pCO2 79, pO2 20.  Repeat blood gas shows pH 7.28, pCO2 73, pO2 26.  CXR shows no acute cardiopulmonary process.  EKG, per ED physician, shows Atrial fibrillation. Rate of 100. Mild lateral ST depression. No STEMI. QT mildly prolonged.  Patient given albuterol, Rocephin, doxycycline, DuoNebs, magnesium in the ED.  Patient is to be admitted to stepdown under Dr. Junior.    Past Medical History  Past Medical History:   Diagnosis Date    Atrial fibrillation (Multi)     BPH (benign prostatic hyperplasia)     CHF (congestive heart failure) (Multi)     COPD (chronic obstructive pulmonary disease) (Multi)     Hyperlipidemia     Hypertension     QUENTIN (obstructive sleep apnea)        Surgical History  Past Surgical History:   Procedure Laterality Date    CT ABDOMEN PELVIS ANGIOGRAM W AND/OR WO IV CONTRAST  1/3/2023    CT ABDOMEN PELVIS ANGIOGRAM W AND/OR WO IV CONTRAST 1/3/2023 [facility]        Social History  He reports that he has quit smoking. His smoking use included cigarettes. He does not have any smokeless tobacco history on file. He reports current alcohol use. No history on file for drug use.    Family History  No family history on file.     Allergies  Patient has no known allergies.    Review of Systems   Constitutional:  Positive for chills. Negative for appetite change, diaphoresis and fever.   HENT:  Negative for congestion, rhinorrhea and sore throat.    Respiratory:  Positive for cough (Productive of silver phlegm), shortness of breath and wheezing.    Cardiovascular:  Negative for chest pain, palpitations and leg swelling.   Gastrointestinal:  Negative for abdominal  "distention, abdominal pain, constipation, diarrhea, nausea and vomiting.   Genitourinary:  Negative for difficulty urinating, dysuria and urgency.   Neurological:  Negative for dizziness, weakness, light-headedness, numbness and headaches.        Physical Exam  Constitutional:       General: He is not in acute distress.     Appearance: Normal appearance. He is not ill-appearing or toxic-appearing.   HENT:      Head: Normocephalic and atraumatic.      Nose: Nose normal.      Mouth/Throat:      Mouth: Mucous membranes are moist.      Pharynx: Oropharynx is clear.   Eyes:      Extraocular Movements: Extraocular movements intact.      Conjunctiva/sclera: Conjunctivae normal.      Pupils: Pupils are equal, round, and reactive to light.   Cardiovascular:      Rate and Rhythm: Normal rate. Rhythm irregular.      Pulses: Normal pulses.   Pulmonary:      Breath sounds: Wheezing (Present in all lung fields) present.      Comments: Pt on BiPAP  Abdominal:      General: Bowel sounds are normal. There is distension.      Palpations: Abdomen is soft.      Tenderness: There is no abdominal tenderness. There is no guarding.   Musculoskeletal:         General: Normal range of motion.      Cervical back: Normal range of motion.      Right lower leg: No edema.      Left lower leg: No edema.   Skin:     General: Skin is warm and dry.   Neurological:      General: No focal deficit present.      Mental Status: He is alert and oriented to person, place, and time.   Psychiatric:         Mood and Affect: Mood normal.         Behavior: Behavior normal.         Thought Content: Thought content normal.         Judgment: Judgment normal.          Last Recorded Vitals  Blood pressure 116/69, pulse (!) 101, temperature 37 °C (98.6 °F), resp. rate 16, height 1.778 m (5' 10\"), weight 89.4 kg (197 lb), SpO2 95%.    Relevant Results    Results for orders placed or performed during the hospital encounter of 05/31/24 (from the past 24 hour(s))   Blood " Gas, Venous Full Panel   Result Value Ref Range    POCT pH, Venous 7.26 (L) 7.33 - 7.43 pH    POCT pCO2, Venous 79 (HH) 41 - 51 mm Hg    POCT pO2, Venous 20 (L) 35 - 45 mm Hg    POCT SO2, Venous 23 (L) 45 - 75 %    POCT Oxy Hemoglobin, Venous 23.0 (L) 45.0 - 75.0 %    POCT Hematocrit Calculated, Venous 35.0 (L) 41.0 - 52.0 %    POCT Sodium, Venous 139 136 - 145 mmol/L    POCT Potassium, Venous 4.6 3.5 - 5.3 mmol/L    POCT Chloride, Venous 96 (L) 98 - 107 mmol/L    POCT Ionized Calicum, Venous 1.19 1.10 - 1.33 mmol/L    POCT Glucose, Venous 141 (H) 74 - 99 mg/dL    POCT Lactate, Venous 1.3 0.4 - 2.0 mmol/L    POCT Base Excess, Venous 6.1 (H) -2.0 - 3.0 mmol/L    POCT HCO3 Calculated, Venous 35.5 (H) 22.0 - 26.0 mmol/L    POCT Hemoglobin, Venous 11.5 (L) 13.5 - 17.5 g/dL    POCT Anion Gap, Venous 12.0 10.0 - 25.0 mmol/L    Patient Temperature 37.0 degrees Celsius    FiO2 40 %    Critical Called By FAITH SAMSON, RRT     Critical Called To DR. CASTRO     Critical Call Time 50     Critical Read Back Y    CBC and Auto Differential   Result Value Ref Range    WBC 7.6 4.4 - 11.3 x10*3/uL    nRBC 0.0 0.0 - 0.0 /100 WBCs    RBC 3.73 (L) 4.50 - 5.90 x10*6/uL    Hemoglobin 11.7 (L) 13.5 - 17.5 g/dL    Hematocrit 36.7 (L) 41.0 - 52.0 %    MCV 98 80 - 100 fL    MCH 31.4 26.0 - 34.0 pg    MCHC 31.9 (L) 32.0 - 36.0 g/dL    RDW 15.3 (H) 11.5 - 14.5 %    Platelets 151 150 - 450 x10*3/uL    Neutrophils % 52.6 40.0 - 80.0 %    Immature Granulocytes %, Automated 0.3 0.0 - 0.9 %    Lymphocytes % 27.6 13.0 - 44.0 %    Monocytes % 11.9 2.0 - 10.0 %    Eosinophils % 7.3 0.0 - 6.0 %    Basophils % 0.3 0.0 - 2.0 %    Neutrophils Absolute 4.02 1.60 - 5.50 x10*3/uL    Immature Granulocytes Absolute, Automated 0.02 0.00 - 0.50 x10*3/uL    Lymphocytes Absolute 2.11 0.80 - 3.00 x10*3/uL    Monocytes Absolute 0.91 (H) 0.05 - 0.80 x10*3/uL    Eosinophils Absolute 0.56 (H) 0.00 - 0.40 x10*3/uL    Basophils Absolute 0.02 0.00 - 0.10 x10*3/uL    Magnesium   Result Value Ref Range    Magnesium 2.21 1.60 - 2.40 mg/dL   Comprehensive metabolic panel   Result Value Ref Range    Glucose 137 (H) 74 - 99 mg/dL    Sodium 140 136 - 145 mmol/L    Potassium 4.4 3.5 - 5.3 mmol/L    Chloride 99 98 - 107 mmol/L    Bicarbonate 33 (H) 21 - 32 mmol/L    Anion Gap 12 10 - 20 mmol/L    Urea Nitrogen 49 (H) 6 - 23 mg/dL    Creatinine 1.59 (H) 0.50 - 1.30 mg/dL    eGFR 41 (L) >60 mL/min/1.73m*2    Calcium 8.9 8.6 - 10.3 mg/dL    Albumin 3.6 3.4 - 5.0 g/dL    Alkaline Phosphatase 112 33 - 136 U/L    Total Protein 6.7 6.4 - 8.2 g/dL    AST 18 9 - 39 U/L    Bilirubin, Total 0.4 0.0 - 1.2 mg/dL    ALT 11 10 - 52 U/L   B-Type Natriuretic Peptide   Result Value Ref Range     (H) 0 - 99 pg/mL   Troponin I, High Sensitivity, Initial   Result Value Ref Range    Troponin I, High Sensitivity 36 (H) 0 - 20 ng/L   Sars-CoV-2 PCR   Result Value Ref Range    Coronavirus 2019, PCR Not Detected Not Detected   Troponin, High Sensitivity, 1 Hour   Result Value Ref Range    Troponin I, High Sensitivity 48 (H) 0 - 20 ng/L   Blood Gas Venous Full Panel   Result Value Ref Range    POCT pH, Venous 7.28 (L) 7.33 - 7.43 pH    POCT pCO2, Venous 73 (HH) 41 - 51 mm Hg    POCT pO2, Venous 26 (L) 35 - 45 mm Hg    POCT SO2, Venous 35 (L) 45 - 75 %    POCT Oxy Hemoglobin, Venous 34.5 (L) 45.0 - 75.0 %    POCT Hematocrit Calculated, Venous 32.0 (L) 41.0 - 52.0 %    POCT Sodium, Venous 139 136 - 145 mmol/L    POCT Potassium, Venous 4.6 3.5 - 5.3 mmol/L    POCT Chloride, Venous 97 (L) 98 - 107 mmol/L    POCT Ionized Calicum, Venous 1.18 1.10 - 1.33 mmol/L    POCT Glucose, Venous 125 (H) 74 - 99 mg/dL    POCT Lactate, Venous 1.3 0.4 - 2.0 mmol/L    POCT Base Excess, Venous 5.7 (H) -2.0 - 3.0 mmol/L    POCT HCO3 Calculated, Venous 34.3 (H) 22.0 - 26.0 mmol/L    POCT Hemoglobin, Venous 10.8 (L) 13.5 - 17.5 g/dL    POCT Anion Gap, Venous 12.0 10.0 - 25.0 mmol/L    Patient Temperature 37.0 degrees Celsius     FiO2 50 %    Critical Called By FAITH SAMSON, RRT     Critical Called To DR. CASTRO     Critical Call Time 218     Critical Read Back Y    Troponin I, High Sensitivity   Result Value Ref Range    Troponin I, High Sensitivity 350 (HH) 0 - 20 ng/L      XR chest 1 view    Result Date: 5/31/2024  STUDY: Chest Radiograph;  05/31/2024 at 1:50 AM INDICATION: Shortness of breath. COMPARISON: XR chest 06/12/22, 01/12/22, 06/05/21. ACCESSION NUMBER(S): [id number] ORDERING CLINICIAN: ALEXX KAY TECHNIQUE:  Frontal chest was obtained at 0150 hours. FINDINGS: CARDIOMEDIASTINAL SILHOUETTE: Cardiomediastinal silhouette is normal in size and configuration.  LUNGS: Lungs are clear.  ABDOMEN: No remarkable upper abdominal findings.  BONES: No acute osseous changes.    No acute cardiopulmonary process. Signed by Rivera Fay MD         Assessment/Plan   Principal Problem:    COPD exacerbation (Multi)    Acute on chronic hypoxic respiratory failure  Suspect COPD exacerbation  - Pulmonology consult and reccs appreciated  -Initial blood gas shows pH 7.26, pCO2 79, pO2 20.  Repeat blood gas shows pH 7.28, pCO2 73, pO2 26  - CXR shows no acute cardiopulmonary process  -Patient currently on BiPAP  -IV Rocephin and doxycycline initiated in the ED, switch to IV Azithromycin  -Scheduled DuoNebs  -PRN albuterol  -Maintain oxygen saturations greater than 92%  -CBC and BMP in AM    HFpEF  Paroxysmal atrial fibrillation  Hypertension  Hyperlipidemia  Elevated troponins  Elevated BNP  -EKG, per ED physician, shows Atrial fibrillation. Rate of 100. Mild lateral ST depression. No STEMI. QT mildly prolonged  -Initial troponin 36, repeat troponin 48, third troponin ordered.  Trend  -Third troponin 350- discussed with Dr. Junior, likely demand ischemia. Consult to cardiology placed, recommendations appreciated  -Last echo on 1/4/2023, shows EF of 55-60%. Echo ordered  -Pt states he has not taken his medications yet today  -Continue home  medications as appropriate     QUENTIN  -Continue use of BiPAP, depending how patient progresses, can switch to use of CPAP at night per usual    Acute kidney injury  -Creatinine 1.59 today, 1.32 on 1/6/2023  -Defer fluids for now due to history of CHF  -Hold torsemide and decrease dose of allopurinol to 100mg  -Monitor with BMP in the a.m.    Anemia  -Hgb 11.7 today, 9.6 on 1/6/23  -Monitor with morning CBC   Patient fully evaluated on May 31.  Continue to monitor BUN and creatinine.  Elevated troponin might be demand ischemia.  Cardiology consultation obtained.  Repeat echo ordered.    I spent 60 minutes in the professional and overall care of this patient.      Rick Junior MD

## 2024-05-31 NOTE — H&P
"History Of Present Illness  Donnie Sims is a 89 y.o. male with a past medical history of HFpEF, paroxysmal atrial fibrillation, hypertension, hyperlipidemia, COPD on 2 L at night, chronic respiratory failure, QUENTIN (on CPAP at night), BPH who presents today in the setting of respiratory distress.  Patient states he has been feeling more short of breath over the last 2-3 days, but then became acutely short of breath at around 10:30 PM last night, 5/30.  He attempted to use his CPAP, which he tells me did help \"a little bit,\" but his breathing still felt strange to him, so his wife called EMS.  He tells me his wife make sure he takes his medications daily, and denies any recent changes to his medications.  He has not taken his medications yet today.  He denies recent travel, hormone use, current tobacco use, history of DVT/PE.  He endorses chills at home, productive cough of \"silver\" phlegm and does have to elevate his head when he sleeps at night, but denies fevers, headache, dizziness, weakness, vision changes, sore throat, rhinorrhea, congestion chest pain, palpitations, leg swelling, abdominal pain, nausea/vomiting, diarrhea, numbness/tingling, urinary symptoms.  He lives with his wife and uses a walker to get around. Of note, on arrival of EMS, patient was saturating 69% on 4L NC. Patient arrived to ED on CPAP, and was given 1 douneb and solumedrol en route. Oxygen saturation then came up to 94%. I did discuss code status with patient, and he tells me he is wanting to be a DNR-CCA but does not want to fill out the paperwork without his wife present, so he will be full code until his wife arrives and they can have the discussion together.     ED Course: On arrival, patient's /93, heart rate 108, respirations 24, saturating 96% on BiPAP.  Patient's heart rate did go down to 93, but is now up to 116. Patient symptomatically improved after roughly an hour of BiPAP, however, gas did demonstrate mild " improvement in his pH with a small change in his pCO2. Labs and imaging performed, revealing bicarb 33, creatinine 1.59 (1.32 on 1/6/23), LFTs normal, magnesium normal,  (637 on 1/3/23), initial troponin 36, repeat troponin 48, no leukocytosis, hemoglobin 11.7 (9.6 on 1/6/23).  COVID swab negative.  Initial blood gas shows pH 7.26, pCO2 79, pO2 20.  Repeat blood gas shows pH 7.28, pCO2 73, pO2 26.  CXR shows no acute cardiopulmonary process.  EKG, per ED physician, shows Atrial fibrillation. Rate of 100. Mild lateral ST depression. No STEMI. QT mildly prolonged.  Patient given albuterol, Rocephin, doxycycline, DuoNebs, magnesium in the ED.  Patient is to be admitted to stepdown under Dr. Junior.    Past Medical History  Past Medical History:   Diagnosis Date    Atrial fibrillation (Multi)     BPH (benign prostatic hyperplasia)     CHF (congestive heart failure) (Multi)     COPD (chronic obstructive pulmonary disease) (Multi)     Hyperlipidemia     Hypertension     QUENTIN (obstructive sleep apnea)        Surgical History  Past Surgical History:   Procedure Laterality Date    CT ABDOMEN PELVIS ANGIOGRAM W AND/OR WO IV CONTRAST  1/3/2023    CT ABDOMEN PELVIS ANGIOGRAM W AND/OR WO IV CONTRAST 1/3/2023 [facility]        Social History  He reports that he has quit smoking. His smoking use included cigarettes. He does not have any smokeless tobacco history on file. He reports current alcohol use. No history on file for drug use.    Family History  No family history on file.     Allergies  Patient has no known allergies.    Review of Systems   Constitutional:  Positive for chills. Negative for appetite change, diaphoresis and fever.   HENT:  Negative for congestion, rhinorrhea and sore throat.    Respiratory:  Positive for cough (Productive of silver phlegm), shortness of breath and wheezing.    Cardiovascular:  Negative for chest pain, palpitations and leg swelling.   Gastrointestinal:  Negative for abdominal  "distention, abdominal pain, constipation, diarrhea, nausea and vomiting.   Genitourinary:  Negative for difficulty urinating, dysuria and urgency.   Neurological:  Negative for dizziness, weakness, light-headedness, numbness and headaches.        Physical Exam  Constitutional:       General: He is not in acute distress.     Appearance: Normal appearance. He is not ill-appearing or toxic-appearing.   HENT:      Head: Normocephalic and atraumatic.      Nose: Nose normal.      Mouth/Throat:      Mouth: Mucous membranes are moist.      Pharynx: Oropharynx is clear.   Eyes:      Extraocular Movements: Extraocular movements intact.      Conjunctiva/sclera: Conjunctivae normal.      Pupils: Pupils are equal, round, and reactive to light.   Cardiovascular:      Rate and Rhythm: Normal rate. Rhythm irregular.      Pulses: Normal pulses.   Pulmonary:      Breath sounds: Wheezing (Present in all lung fields) present.      Comments: Pt on BiPAP  Abdominal:      General: Bowel sounds are normal. There is distension.      Palpations: Abdomen is soft.      Tenderness: There is no abdominal tenderness. There is no guarding.   Musculoskeletal:         General: Normal range of motion.      Cervical back: Normal range of motion.      Right lower leg: No edema.      Left lower leg: No edema.   Skin:     General: Skin is warm and dry.   Neurological:      General: No focal deficit present.      Mental Status: He is alert and oriented to person, place, and time.   Psychiatric:         Mood and Affect: Mood normal.         Behavior: Behavior normal.         Thought Content: Thought content normal.         Judgment: Judgment normal.          Last Recorded Vitals  Blood pressure 129/71, pulse 97, temperature 37 °C (98.6 °F), resp. rate 18, height 1.778 m (5' 10\"), weight 89.4 kg (197 lb), SpO2 (!) 93%.    Relevant Results    Results for orders placed or performed during the hospital encounter of 05/31/24 (from the past 24 hour(s))   Blood " Gas, Venous Full Panel   Result Value Ref Range    POCT pH, Venous 7.26 (L) 7.33 - 7.43 pH    POCT pCO2, Venous 79 (HH) 41 - 51 mm Hg    POCT pO2, Venous 20 (L) 35 - 45 mm Hg    POCT SO2, Venous 23 (L) 45 - 75 %    POCT Oxy Hemoglobin, Venous 23.0 (L) 45.0 - 75.0 %    POCT Hematocrit Calculated, Venous 35.0 (L) 41.0 - 52.0 %    POCT Sodium, Venous 139 136 - 145 mmol/L    POCT Potassium, Venous 4.6 3.5 - 5.3 mmol/L    POCT Chloride, Venous 96 (L) 98 - 107 mmol/L    POCT Ionized Calicum, Venous 1.19 1.10 - 1.33 mmol/L    POCT Glucose, Venous 141 (H) 74 - 99 mg/dL    POCT Lactate, Venous 1.3 0.4 - 2.0 mmol/L    POCT Base Excess, Venous 6.1 (H) -2.0 - 3.0 mmol/L    POCT HCO3 Calculated, Venous 35.5 (H) 22.0 - 26.0 mmol/L    POCT Hemoglobin, Venous 11.5 (L) 13.5 - 17.5 g/dL    POCT Anion Gap, Venous 12.0 10.0 - 25.0 mmol/L    Patient Temperature 37.0 degrees Celsius    FiO2 40 %    Critical Called By FAITH SAMSON, RRT     Critical Called To DR. CASTRO     Critical Call Time 50     Critical Read Back Y    CBC and Auto Differential   Result Value Ref Range    WBC 7.6 4.4 - 11.3 x10*3/uL    nRBC 0.0 0.0 - 0.0 /100 WBCs    RBC 3.73 (L) 4.50 - 5.90 x10*6/uL    Hemoglobin 11.7 (L) 13.5 - 17.5 g/dL    Hematocrit 36.7 (L) 41.0 - 52.0 %    MCV 98 80 - 100 fL    MCH 31.4 26.0 - 34.0 pg    MCHC 31.9 (L) 32.0 - 36.0 g/dL    RDW 15.3 (H) 11.5 - 14.5 %    Platelets 151 150 - 450 x10*3/uL    Neutrophils % 52.6 40.0 - 80.0 %    Immature Granulocytes %, Automated 0.3 0.0 - 0.9 %    Lymphocytes % 27.6 13.0 - 44.0 %    Monocytes % 11.9 2.0 - 10.0 %    Eosinophils % 7.3 0.0 - 6.0 %    Basophils % 0.3 0.0 - 2.0 %    Neutrophils Absolute 4.02 1.60 - 5.50 x10*3/uL    Immature Granulocytes Absolute, Automated 0.02 0.00 - 0.50 x10*3/uL    Lymphocytes Absolute 2.11 0.80 - 3.00 x10*3/uL    Monocytes Absolute 0.91 (H) 0.05 - 0.80 x10*3/uL    Eosinophils Absolute 0.56 (H) 0.00 - 0.40 x10*3/uL    Basophils Absolute 0.02 0.00 - 0.10 x10*3/uL    Magnesium   Result Value Ref Range    Magnesium 2.21 1.60 - 2.40 mg/dL   Comprehensive metabolic panel   Result Value Ref Range    Glucose 137 (H) 74 - 99 mg/dL    Sodium 140 136 - 145 mmol/L    Potassium 4.4 3.5 - 5.3 mmol/L    Chloride 99 98 - 107 mmol/L    Bicarbonate 33 (H) 21 - 32 mmol/L    Anion Gap 12 10 - 20 mmol/L    Urea Nitrogen 49 (H) 6 - 23 mg/dL    Creatinine 1.59 (H) 0.50 - 1.30 mg/dL    eGFR 41 (L) >60 mL/min/1.73m*2    Calcium 8.9 8.6 - 10.3 mg/dL    Albumin 3.6 3.4 - 5.0 g/dL    Alkaline Phosphatase 112 33 - 136 U/L    Total Protein 6.7 6.4 - 8.2 g/dL    AST 18 9 - 39 U/L    Bilirubin, Total 0.4 0.0 - 1.2 mg/dL    ALT 11 10 - 52 U/L   B-Type Natriuretic Peptide   Result Value Ref Range     (H) 0 - 99 pg/mL   Troponin I, High Sensitivity, Initial   Result Value Ref Range    Troponin I, High Sensitivity 36 (H) 0 - 20 ng/L   Sars-CoV-2 PCR   Result Value Ref Range    Coronavirus 2019, PCR Not Detected Not Detected   Troponin, High Sensitivity, 1 Hour   Result Value Ref Range    Troponin I, High Sensitivity 48 (H) 0 - 20 ng/L   Blood Gas Venous Full Panel   Result Value Ref Range    POCT pH, Venous 7.28 (L) 7.33 - 7.43 pH    POCT pCO2, Venous 73 (HH) 41 - 51 mm Hg    POCT pO2, Venous 26 (L) 35 - 45 mm Hg    POCT SO2, Venous 35 (L) 45 - 75 %    POCT Oxy Hemoglobin, Venous 34.5 (L) 45.0 - 75.0 %    POCT Hematocrit Calculated, Venous 32.0 (L) 41.0 - 52.0 %    POCT Sodium, Venous 139 136 - 145 mmol/L    POCT Potassium, Venous 4.6 3.5 - 5.3 mmol/L    POCT Chloride, Venous 97 (L) 98 - 107 mmol/L    POCT Ionized Calicum, Venous 1.18 1.10 - 1.33 mmol/L    POCT Glucose, Venous 125 (H) 74 - 99 mg/dL    POCT Lactate, Venous 1.3 0.4 - 2.0 mmol/L    POCT Base Excess, Venous 5.7 (H) -2.0 - 3.0 mmol/L    POCT HCO3 Calculated, Venous 34.3 (H) 22.0 - 26.0 mmol/L    POCT Hemoglobin, Venous 10.8 (L) 13.5 - 17.5 g/dL    POCT Anion Gap, Venous 12.0 10.0 - 25.0 mmol/L    Patient Temperature 37.0 degrees Celsius     FiO2 50 %    Critical Called By FAITH SAMSON RRT     Critical Called To DR. CASTRO     Critical Call Time 218     Critical Read Back Y       XR chest 1 view    Result Date: 5/31/2024  STUDY: Chest Radiograph;  05/31/2024 at 1:50 AM INDICATION: Shortness of breath. COMPARISON: XR chest 06/12/22, 01/12/22, 06/05/21. ACCESSION NUMBER(S): [id number] ORDERING CLINICIAN: ALEXX KAY TECHNIQUE:  Frontal chest was obtained at 0150 hours. FINDINGS: CARDIOMEDIASTINAL SILHOUETTE: Cardiomediastinal silhouette is normal in size and configuration.  LUNGS: Lungs are clear.  ABDOMEN: No remarkable upper abdominal findings.  BONES: No acute osseous changes.    No acute cardiopulmonary process. Signed by Rivera Fay MD         Assessment/Plan   Principal Problem:    COPD exacerbation (Multi)    Acute on chronic hypoxic respiratory failure  Suspect COPD exacerbation  - Pulmonology consult and reccs appreciated  -Initial blood gas shows pH 7.26, pCO2 79, pO2 20.  Repeat blood gas shows pH 7.28, pCO2 73, pO2 26  - CXR shows no acute cardiopulmonary process  -Patient currently on BiPAP  -IV Rocephin and doxycycline initiated in the ED, switch to IV Azithromycin  -Scheduled DuoNebs  -PRN albuterol  -Maintain oxygen saturations greater than 92%  -CBC and BMP in AM    HFpEF  Paroxysmal atrial fibrillation  Hypertension  Hyperlipidemia  Elevated troponins  Elevated BNP  -EKG, per ED physician, shows Atrial fibrillation. Rate of 100. Mild lateral ST depression. No STEMI. QT mildly prolonged  -Initial troponin 36, repeat troponin 48, third troponin ordered.  Trend  -Third troponin 350- discussed with Dr. Junior, likely demand ischemia. Consult to cardiology placed, recommendations appreciated  -Last echo on 1/4/2023, shows EF of 55-60%. Echo ordered  -Pt states he has not taken his medications yet today  -Continue home medications as appropriate     QUENTIN  -Continue use of BiPAP, depending how patient progresses, can switch to use of  CPAP at night per usual    Acute kidney injury  -Creatinine 1.59 today, 1.32 on 1/6/2023  -Defer fluids for now due to history of CHF  -Hold torsemide and decrease dose of allopurinol to 100mg  -Monitor with BMP in the a.m.    Anemia  -Hgb 11.7 today, 9.6 on 1/6/23  -Monitor with morning CBC       I spent 60 minutes in the professional and overall care of this patient.      HOPE ZhaoC

## 2024-05-31 NOTE — PROGRESS NOTES
Pharmacy Medication History Review    Donnie Sims is a 89 y.o. male admitted for COPD exacerbation (Multi). Pharmacy reviewed the patient's [id number] medications and allergies for accuracy.    The list below reflectives the updated PTA list. Please review each medication in order reconciliation for additional clarification and justification.  Prior to Admission medications    Medication Sig Start Date End Date Taking? Authorizing Provider   albuterol 90 mcg/actuation inhaler Inhale 2 puffs every 6 hours if needed for wheezing or shortness of breath. 8/1/23  Yes Historical Provider, MD   allopurinol (Zyloprim) 300 mg tablet Take 1 tablet (300 mg) by mouth once daily. 9/27/22  Yes Historical Provider, MD   atorvastatin (Lipitor) 80 mg tablet Take 0.5 tablets (40 mg) by mouth once daily at bedtime. 6/28/23  Yes Historical Provider, MD   cholecalciferol (Vitamin D-3) 50,000 unit capsule Take 1 capsule (50,000 Units) by mouth every 30 (thirty) days. 12/28/23  Yes Historical Provider, MD   Eucerin cream Apply 1 Application topically once daily. Apply to bilateral feet 12/28/23  Yes Historical Provider, MD   guaiFENesin (Humibid 3) 400 mg tablet Take 1 tablet (400 mg) by mouth every 4 hours if needed for cough or congestion. 4/9/24  Yes Historical Provider, MD   metoprolol tartrate (Lopressor) 25 mg tablet Take 1 tablet (25 mg) by mouth 2 times a day. 1/29/24  Yes Historical Provider, MD   oxygen (O2) gas therapy Inhale continuously. 6/29/23  Yes Historical Provider, MD   torsemide (Demadex) 20 mg tablet Take 1-2 tablets (20-40 mg) by mouth once daily as needed (1 tab at 211 lbs / 2 tabs >214 lbs). 12/11/23  Yes Historical Provider, MD   aspirin 81 mg EC tablet Take 1 tablet (81 mg) by mouth once daily.   Yes Historical Provider, MD   fluticasone (Flonase) 50 mcg/actuation nasal spray Administer 1 spray into each nostril once daily as needed for allergies. Shake gently. Before first use, prime pump. After use,  clean tip and replace cap.   Yes Historical Provider, MD        The list below reflectives the updated allergy list. Please review each documented allergy for additional clarification and justification.  Allergies  Reviewed by Rosa Lauren RN on 5/31/2024   No Known Allergies         Below are additional concerns with the patient's PTA list.        Renee Vasquez

## 2024-06-01 ENCOUNTER — APPOINTMENT (OUTPATIENT)
Dept: RADIOLOGY | Facility: HOSPITAL | Age: 89
DRG: 190 | End: 2024-06-01
Payer: MEDICARE

## 2024-06-01 ENCOUNTER — APPOINTMENT (OUTPATIENT)
Dept: CARDIOLOGY | Facility: HOSPITAL | Age: 89
DRG: 190 | End: 2024-06-01
Payer: MEDICARE

## 2024-06-01 LAB
ALBUMIN SERPL BCP-MCNC: 3.6 G/DL (ref 3.4–5)
ALP SERPL-CCNC: 102 U/L (ref 33–136)
ALT SERPL W P-5'-P-CCNC: 10 U/L (ref 10–52)
ANION GAP SERPL CALC-SCNC: 19 MMOL/L (ref 10–20)
AST SERPL W P-5'-P-CCNC: 19 U/L (ref 9–39)
BILIRUB SERPL-MCNC: 0.4 MG/DL (ref 0–1.2)
BUN SERPL-MCNC: 53 MG/DL (ref 6–23)
CALCIUM SERPL-MCNC: 9.2 MG/DL (ref 8.6–10.3)
CHLORIDE SERPL-SCNC: 100 MMOL/L (ref 98–107)
CO2 SERPL-SCNC: 26 MMOL/L (ref 21–32)
CREAT SERPL-MCNC: 1.23 MG/DL (ref 0.5–1.3)
EGFRCR SERPLBLD CKD-EPI 2021: 56 ML/MIN/1.73M*2
ERYTHROCYTE [DISTWIDTH] IN BLOOD BY AUTOMATED COUNT: 15.1 % (ref 11.5–14.5)
GLUCOSE SERPL-MCNC: 126 MG/DL (ref 74–99)
HCT VFR BLD AUTO: 34.5 % (ref 41–52)
HGB BLD-MCNC: 11 G/DL (ref 13.5–17.5)
MCH RBC QN AUTO: 30.6 PG (ref 26–34)
MCHC RBC AUTO-ENTMCNC: 31.9 G/DL (ref 32–36)
MCV RBC AUTO: 96 FL (ref 80–100)
NRBC BLD-RTO: 0 /100 WBCS (ref 0–0)
PLATELET # BLD AUTO: 170 X10*3/UL (ref 150–450)
POTASSIUM SERPL-SCNC: 5.1 MMOL/L (ref 3.5–5.3)
PROT SERPL-MCNC: 6.1 G/DL (ref 6.4–8.2)
RBC # BLD AUTO: 3.6 X10*6/UL (ref 4.5–5.9)
SODIUM SERPL-SCNC: 140 MMOL/L (ref 136–145)
WBC # BLD AUTO: 16 X10*3/UL (ref 4.4–11.3)

## 2024-06-01 PROCEDURE — 36415 COLL VENOUS BLD VENIPUNCTURE: CPT

## 2024-06-01 PROCEDURE — 2500000001 HC RX 250 WO HCPCS SELF ADMINISTERED DRUGS (ALT 637 FOR MEDICARE OP): Performed by: INTERNAL MEDICINE

## 2024-06-01 PROCEDURE — 2500000004 HC RX 250 GENERAL PHARMACY W/ HCPCS (ALT 636 FOR OP/ED)

## 2024-06-01 PROCEDURE — 80053 COMPREHEN METABOLIC PANEL: CPT | Performed by: INTERNAL MEDICINE

## 2024-06-01 PROCEDURE — 93010 ELECTROCARDIOGRAM REPORT: CPT | Performed by: STUDENT IN AN ORGANIZED HEALTH CARE EDUCATION/TRAINING PROGRAM

## 2024-06-01 PROCEDURE — 71046 X-RAY EXAM CHEST 2 VIEWS: CPT

## 2024-06-01 PROCEDURE — 2060000001 HC INTERMEDIATE ICU ROOM DAILY

## 2024-06-01 PROCEDURE — 2500000001 HC RX 250 WO HCPCS SELF ADMINISTERED DRUGS (ALT 637 FOR MEDICARE OP)

## 2024-06-01 PROCEDURE — 2500000002 HC RX 250 W HCPCS SELF ADMINISTERED DRUGS (ALT 637 FOR MEDICARE OP, ALT 636 FOR OP/ED)

## 2024-06-01 PROCEDURE — 85027 COMPLETE CBC AUTOMATED: CPT

## 2024-06-01 PROCEDURE — 71046 X-RAY EXAM CHEST 2 VIEWS: CPT | Performed by: RADIOLOGY

## 2024-06-01 PROCEDURE — 94640 AIRWAY INHALATION TREATMENT: CPT

## 2024-06-01 PROCEDURE — 93005 ELECTROCARDIOGRAM TRACING: CPT

## 2024-06-01 RX ORDER — PREDNISOLONE 15 MG/5ML
20 SOLUTION ORAL DAILY
Status: DISCONTINUED | OUTPATIENT
Start: 2024-06-02 | End: 2024-06-01

## 2024-06-01 RX ORDER — IPRATROPIUM BROMIDE AND ALBUTEROL SULFATE 2.5; .5 MG/3ML; MG/3ML
3 SOLUTION RESPIRATORY (INHALATION)
Status: DISCONTINUED | OUTPATIENT
Start: 2024-06-02 | End: 2024-06-02 | Stop reason: HOSPADM

## 2024-06-01 RX ORDER — LEVOFLOXACIN 500 MG/1
750 TABLET, FILM COATED ORAL
Status: DISCONTINUED | OUTPATIENT
Start: 2024-06-01 | End: 2024-06-02 | Stop reason: HOSPADM

## 2024-06-01 RX ORDER — PREDNISONE 20 MG/1
20 TABLET ORAL DAILY
Status: DISCONTINUED | OUTPATIENT
Start: 2024-06-02 | End: 2024-06-02 | Stop reason: HOSPADM

## 2024-06-01 RX ADMIN — ATORVASTATIN CALCIUM 40 MG: 40 TABLET, FILM COATED ORAL at 21:39

## 2024-06-01 RX ADMIN — ALLOPURINOL 100 MG: 100 TABLET ORAL at 08:43

## 2024-06-01 RX ADMIN — IPRATROPIUM BROMIDE AND ALBUTEROL SULFATE 3 ML: .5; 3 SOLUTION RESPIRATORY (INHALATION) at 13:02

## 2024-06-01 RX ADMIN — METOPROLOL TARTRATE 25 MG: 25 TABLET, FILM COATED ORAL at 08:43

## 2024-06-01 RX ADMIN — Medication 1 TABLET: at 08:43

## 2024-06-01 RX ADMIN — ASPIRIN 81 MG: 81 TABLET, COATED ORAL at 08:43

## 2024-06-01 RX ADMIN — AZITHROMYCIN MONOHYDRATE 500 MG: 500 INJECTION, POWDER, LYOPHILIZED, FOR SOLUTION INTRAVENOUS at 11:50

## 2024-06-01 RX ADMIN — WHITE PETROLATUM: 1.75 OINTMENT TOPICAL at 09:17

## 2024-06-01 RX ADMIN — HEPARIN SODIUM 5000 UNITS: 5000 INJECTION INTRAVENOUS; SUBCUTANEOUS at 21:38

## 2024-06-01 RX ADMIN — METHYLPREDNISOLONE SODIUM SUCCINATE 40 MG: 40 INJECTION, POWDER, FOR SOLUTION INTRAMUSCULAR; INTRAVENOUS at 14:20

## 2024-06-01 RX ADMIN — METOPROLOL TARTRATE 25 MG: 25 TABLET, FILM COATED ORAL at 21:38

## 2024-06-01 RX ADMIN — METHYLPREDNISOLONE SODIUM SUCCINATE 40 MG: 40 INJECTION, POWDER, FOR SOLUTION INTRAMUSCULAR; INTRAVENOUS at 05:53

## 2024-06-01 RX ADMIN — LEVOFLOXACIN 750 MG: 500 TABLET, FILM COATED ORAL at 21:39

## 2024-06-01 RX ADMIN — HEPARIN SODIUM 5000 UNITS: 5000 INJECTION INTRAVENOUS; SUBCUTANEOUS at 11:50

## 2024-06-01 RX ADMIN — Medication 1 TABLET: at 21:39

## 2024-06-01 RX ADMIN — HEPARIN SODIUM 5000 UNITS: 5000 INJECTION INTRAVENOUS; SUBCUTANEOUS at 03:22

## 2024-06-01 RX ADMIN — IPRATROPIUM BROMIDE AND ALBUTEROL SULFATE 3 ML: .5; 3 SOLUTION RESPIRATORY (INHALATION) at 19:09

## 2024-06-01 RX ADMIN — IPRATROPIUM BROMIDE AND ALBUTEROL SULFATE 3 ML: .5; 3 SOLUTION RESPIRATORY (INHALATION) at 06:54

## 2024-06-01 ASSESSMENT — PAIN SCALES - GENERAL
PAINLEVEL_OUTOF10: 0 - NO PAIN
PAINLEVEL_OUTOF10: 0 - NO PAIN

## 2024-06-01 NOTE — PROGRESS NOTES
Donnie Sims is a 89 y.o. male on day 1 of admission presenting with COPD exacerbation (Multi).      Subjective   Patient fully evaluated on June 1. Continue to monitor BUN and creatinine. Elevated troponin might be demand ischemia.        Objective     Last Recorded Vitals  /59   Pulse 78   Temp 36.4 °C (97.5 °F)   Resp 18   Wt 89.4 kg (197 lb)   SpO2 97%   Intake/Output last 3 Shifts:    Intake/Output Summary (Last 24 hours) at 6/1/2024 1459  Last data filed at 6/1/2024 1245  Gross per 24 hour   Intake 500 ml   Output --   Net 500 ml       Admission Weight  Weight: 89.4 kg (197 lb) (05/31/24 0036)    Daily Weight  05/31/24 : 89.4 kg (197 lb)    Image Results  XR chest 2 views  Narrative: Interpreted By:  Britt Coulter,   STUDY:  XR CHEST 2 VIEWS;  6/1/2024 12:52 pm      INDICATION:  Signs/Symptoms:SOB.      COMPARISON:  05/31/2024      ACCESSION NUMBER(S):  [id number]      ORDERING CLINICIAN:  RICK DORADO      FINDINGS:  Heart is normal in size.      The lungs are hyperinflated.      There appears to be some patchiness at the left base.      There are atherosclerotic changes of the aorta. A stent graft is  present. There are degenerative changes of the spine.      COMPARISON OF FINDING:  There may be more patchiness at the left base than there was  previously.      Impression: Somewhat limited exam. COPD. Question of left basilar infiltrate.      MACRO:  none      Signed by: Britt Coulter 6/1/2024 2:22 PM  Dictation workstation:   [id number]      Physical Exam    Relevant Results               Assessment/Plan   This patient currently has cardiac telemetry ordered; if you would like to modify or discontinue the telemetry order, click here to go to the orders activity to modify/discontinue the order.              Principal Problem:    COPD exacerbation (Multi)          Rick Dorado MD   Physician  Pulmonology     H&P      Signed     Date of Service: 5/31/2024  2:00 PM     Signed       Expand All  "Collapse All    History Of Present Illness  Donnie Sims is a 89 y.o. male with a past medical history of HFpEF, paroxysmal atrial fibrillation, hypertension, hyperlipidemia, COPD on 2 L at night, chronic respiratory failure, QUENTIN (on CPAP at night), BPH who presents today in the setting of respiratory distress.  Patient states he has been feeling more short of breath over the last 2-3 days, but then became acutely short of breath at around 10:30 PM last night, 5/30.  He attempted to use his CPAP, which he tells me did help \"a little bit,\" but his breathing still felt strange to him, so his wife called EMS.  He tells me his wife make sure he takes his medications daily, and denies any recent changes to his medications.  He has not taken his medications yet today.  He denies recent travel, hormone use, current tobacco use, history of DVT/PE.  He endorses chills at home, productive cough of \"silver\" phlegm and does have to elevate his head when he sleeps at night, but denies fevers, headache, dizziness, weakness, vision changes, sore throat, rhinorrhea, congestion chest pain, palpitations, leg swelling, abdominal pain, nausea/vomiting, diarrhea, numbness/tingling, urinary symptoms.  He lives with his wife and uses a walker to get around. Of note, on arrival of EMS, patient was saturating 69% on 4L NC. Patient arrived to ED on CPAP, and was given 1 douneb and solumedrol en route. Oxygen saturation then came up to 94%. I did discuss code status with patient, and he tells me he is wanting to be a DNR-CCA but does not want to fill out the paperwork without his wife present, so he will be full code until his wife arrives and they can have the discussion together.      ED Course: On arrival, patient's /93, heart rate 108, respirations 24, saturating 96% on BiPAP.  Patient's heart rate did go down to 93, but is now up to 116. Patient symptomatically improved after roughly an hour of BiPAP, however, gas did " demonstrate mild improvement in his pH with a small change in his pCO2. Labs and imaging performed, revealing bicarb 33, creatinine 1.59 (1.32 on 1/6/23), LFTs normal, magnesium normal,  (637 on 1/3/23), initial troponin 36, repeat troponin 48, no leukocytosis, hemoglobin 11.7 (9.6 on 1/6/23).  COVID swab negative.  Initial blood gas shows pH 7.26, pCO2 79, pO2 20.  Repeat blood gas shows pH 7.28, pCO2 73, pO2 26.  CXR shows no acute cardiopulmonary process.  EKG, per ED physician, shows Atrial fibrillation. Rate of 100. Mild lateral ST depression. No STEMI. QT mildly prolonged.  Patient given albuterol, Rocephin, doxycycline, DuoNebs, magnesium in the ED.  Patient is to be admitted to stepdown under Dr. Junior.     Past Medical History  Medical History        Past Medical History:   Diagnosis Date    Atrial fibrillation (Multi)      BPH (benign prostatic hyperplasia)      CHF (congestive heart failure) (Multi)      COPD (chronic obstructive pulmonary disease) (Multi)      Hyperlipidemia      Hypertension      QUENTIN (obstructive sleep apnea)              Surgical History  Surgical History         Past Surgical History:   Procedure Laterality Date    CT ABDOMEN PELVIS ANGIOGRAM W AND/OR WO IV CONTRAST   1/3/2023     CT ABDOMEN PELVIS ANGIOGRAM W AND/OR WO IV CONTRAST 1/3/2023 [facility]            Social History  He reports that he has quit smoking. His smoking use included cigarettes. He does not have any smokeless tobacco history on file. He reports current alcohol use. No history on file for drug use.     Family History  Family History   No family history on file.        Allergies  Patient has no known allergies.     Review of Systems   Constitutional:  Positive for chills. Negative for appetite change, diaphoresis and fever.   HENT:  Negative for congestion, rhinorrhea and sore throat.    Respiratory:  Positive for cough (Productive of silver phlegm), shortness of breath and wheezing.   "  Cardiovascular:  Negative for chest pain, palpitations and leg swelling.   Gastrointestinal:  Negative for abdominal distention, abdominal pain, constipation, diarrhea, nausea and vomiting.   Genitourinary:  Negative for difficulty urinating, dysuria and urgency.   Neurological:  Negative for dizziness, weakness, light-headedness, numbness and headaches.         Physical Exam  Constitutional:       General: He is not in acute distress.     Appearance: Normal appearance. He is not ill-appearing or toxic-appearing.   HENT:      Head: Normocephalic and atraumatic.      Nose: Nose normal.      Mouth/Throat:      Mouth: Mucous membranes are moist.      Pharynx: Oropharynx is clear.   Eyes:      Extraocular Movements: Extraocular movements intact.      Conjunctiva/sclera: Conjunctivae normal.      Pupils: Pupils are equal, round, and reactive to light.   Cardiovascular:      Rate and Rhythm: Normal rate. Rhythm irregular.      Pulses: Normal pulses.   Pulmonary:      Breath sounds: Wheezing (Present in all lung fields) present.      Comments: Pt on BiPAP  Abdominal:      General: Bowel sounds are normal. There is distension.      Palpations: Abdomen is soft.      Tenderness: There is no abdominal tenderness. There is no guarding.   Musculoskeletal:         General: Normal range of motion.      Cervical back: Normal range of motion.      Right lower leg: No edema.      Left lower leg: No edema.   Skin:     General: Skin is warm and dry.   Neurological:      General: No focal deficit present.      Mental Status: He is alert and oriented to person, place, and time.   Psychiatric:         Mood and Affect: Mood normal.         Behavior: Behavior normal.         Thought Content: Thought content normal.         Judgment: Judgment normal.            Last Recorded Vitals  Blood pressure 116/69, pulse (!) 101, temperature 37 °C (98.6 °F), resp. rate 16, height 1.778 m (5' 10\"), weight 89.4 kg (197 lb), SpO2 95%.     Relevant " Results           Results for orders placed or performed during the hospital encounter of 05/31/24 (from the past 24 hour(s))   Blood Gas, Venous Full Panel   Result Value Ref Range     POCT pH, Venous 7.26 (L) 7.33 - 7.43 pH     POCT pCO2, Venous 79 (HH) 41 - 51 mm Hg     POCT pO2, Venous 20 (L) 35 - 45 mm Hg     POCT SO2, Venous 23 (L) 45 - 75 %     POCT Oxy Hemoglobin, Venous 23.0 (L) 45.0 - 75.0 %     POCT Hematocrit Calculated, Venous 35.0 (L) 41.0 - 52.0 %     POCT Sodium, Venous 139 136 - 145 mmol/L     POCT Potassium, Venous 4.6 3.5 - 5.3 mmol/L     POCT Chloride, Venous 96 (L) 98 - 107 mmol/L     POCT Ionized Calicum, Venous 1.19 1.10 - 1.33 mmol/L     POCT Glucose, Venous 141 (H) 74 - 99 mg/dL     POCT Lactate, Venous 1.3 0.4 - 2.0 mmol/L     POCT Base Excess, Venous 6.1 (H) -2.0 - 3.0 mmol/L     POCT HCO3 Calculated, Venous 35.5 (H) 22.0 - 26.0 mmol/L     POCT Hemoglobin, Venous 11.5 (L) 13.5 - 17.5 g/dL     POCT Anion Gap, Venous 12.0 10.0 - 25.0 mmol/L     Patient Temperature 37.0 degrees Celsius     FiO2 40 %     Critical Called By FAITH SAMSON RRT       Critical Called To DR. CASTRO       Critical Call Time 50       Critical Read Back Y     CBC and Auto Differential   Result Value Ref Range     WBC 7.6 4.4 - 11.3 x10*3/uL     nRBC 0.0 0.0 - 0.0 /100 WBCs     RBC 3.73 (L) 4.50 - 5.90 x10*6/uL     Hemoglobin 11.7 (L) 13.5 - 17.5 g/dL     Hematocrit 36.7 (L) 41.0 - 52.0 %     MCV 98 80 - 100 fL     MCH 31.4 26.0 - 34.0 pg     MCHC 31.9 (L) 32.0 - 36.0 g/dL     RDW 15.3 (H) 11.5 - 14.5 %     Platelets 151 150 - 450 x10*3/uL     Neutrophils % 52.6 40.0 - 80.0 %     Immature Granulocytes %, Automated 0.3 0.0 - 0.9 %     Lymphocytes % 27.6 13.0 - 44.0 %     Monocytes % 11.9 2.0 - 10.0 %     Eosinophils % 7.3 0.0 - 6.0 %     Basophils % 0.3 0.0 - 2.0 %     Neutrophils Absolute 4.02 1.60 - 5.50 x10*3/uL     Immature Granulocytes Absolute, Automated 0.02 0.00 - 0.50 x10*3/uL     Lymphocytes Absolute 2.11 0.80 -  3.00 x10*3/uL     Monocytes Absolute 0.91 (H) 0.05 - 0.80 x10*3/uL     Eosinophils Absolute 0.56 (H) 0.00 - 0.40 x10*3/uL     Basophils Absolute 0.02 0.00 - 0.10 x10*3/uL   Magnesium   Result Value Ref Range     Magnesium 2.21 1.60 - 2.40 mg/dL   Comprehensive metabolic panel   Result Value Ref Range     Glucose 137 (H) 74 - 99 mg/dL     Sodium 140 136 - 145 mmol/L     Potassium 4.4 3.5 - 5.3 mmol/L     Chloride 99 98 - 107 mmol/L     Bicarbonate 33 (H) 21 - 32 mmol/L     Anion Gap 12 10 - 20 mmol/L     Urea Nitrogen 49 (H) 6 - 23 mg/dL     Creatinine 1.59 (H) 0.50 - 1.30 mg/dL     eGFR 41 (L) >60 mL/min/1.73m*2     Calcium 8.9 8.6 - 10.3 mg/dL     Albumin 3.6 3.4 - 5.0 g/dL     Alkaline Phosphatase 112 33 - 136 U/L     Total Protein 6.7 6.4 - 8.2 g/dL     AST 18 9 - 39 U/L     Bilirubin, Total 0.4 0.0 - 1.2 mg/dL     ALT 11 10 - 52 U/L   B-Type Natriuretic Peptide   Result Value Ref Range      (H) 0 - 99 pg/mL   Troponin I, High Sensitivity, Initial   Result Value Ref Range     Troponin I, High Sensitivity 36 (H) 0 - 20 ng/L   Sars-CoV-2 PCR   Result Value Ref Range     Coronavirus 2019, PCR Not Detected Not Detected   Troponin, High Sensitivity, 1 Hour   Result Value Ref Range     Troponin I, High Sensitivity 48 (H) 0 - 20 ng/L   Blood Gas Venous Full Panel   Result Value Ref Range     POCT pH, Venous 7.28 (L) 7.33 - 7.43 pH     POCT pCO2, Venous 73 (HH) 41 - 51 mm Hg     POCT pO2, Venous 26 (L) 35 - 45 mm Hg     POCT SO2, Venous 35 (L) 45 - 75 %     POCT Oxy Hemoglobin, Venous 34.5 (L) 45.0 - 75.0 %     POCT Hematocrit Calculated, Venous 32.0 (L) 41.0 - 52.0 %     POCT Sodium, Venous 139 136 - 145 mmol/L     POCT Potassium, Venous 4.6 3.5 - 5.3 mmol/L     POCT Chloride, Venous 97 (L) 98 - 107 mmol/L     POCT Ionized Calicum, Venous 1.18 1.10 - 1.33 mmol/L     POCT Glucose, Venous 125 (H) 74 - 99 mg/dL     POCT Lactate, Venous 1.3 0.4 - 2.0 mmol/L     POCT Base Excess, Venous 5.7 (H) -2.0 - 3.0 mmol/L      POCT HCO3 Calculated, Venous 34.3 (H) 22.0 - 26.0 mmol/L     POCT Hemoglobin, Venous 10.8 (L) 13.5 - 17.5 g/dL     POCT Anion Gap, Venous 12.0 10.0 - 25.0 mmol/L     Patient Temperature 37.0 degrees Celsius     FiO2 50 %     Critical Called By FAITH SAMSON, RRT       Critical Called To DR. CASTRO       Critical Call Time 218       Critical Read Back Y     Troponin I, High Sensitivity   Result Value Ref Range     Troponin I, High Sensitivity 350 (HH) 0 - 20 ng/L      XR chest 1 view     Result Date: 5/31/2024  STUDY: Chest Radiograph;  05/31/2024 at 1:50 AM INDICATION: Shortness of breath. COMPARISON: XR chest 06/12/22, 01/12/22, 06/05/21. ACCESSION NUMBER(S): [id number] ORDERING CLINICIAN: ALEXX KAY TECHNIQUE:  Frontal chest was obtained at 0150 hours. FINDINGS: CARDIOMEDIASTINAL SILHOUETTE: Cardiomediastinal silhouette is normal in size and configuration.  LUNGS: Lungs are clear.  ABDOMEN: No remarkable upper abdominal findings.  BONES: No acute osseous changes.     No acute cardiopulmonary process. Signed by Rivera Fay MD          Assessment/Plan   Principal Problem:    COPD exacerbation (Multi)     Acute on chronic hypoxic respiratory failure  Suspect COPD exacerbation  - Pulmonology consult and reccs appreciated  -Initial blood gas shows pH 7.26, pCO2 79, pO2 20.  Repeat blood gas shows pH 7.28, pCO2 73, pO2 26  - CXR shows no acute cardiopulmonary process  -Patient currently on BiPAP  -IV Rocephin and doxycycline initiated in the ED, switch to IV Azithromycin  -Scheduled DuoNebs  -PRN albuterol  -Maintain oxygen saturations greater than 92%  -CBC and BMP in AM     HFpEF  Paroxysmal atrial fibrillation  Hypertension  Hyperlipidemia  Elevated troponins  Elevated BNP  -EKG, per ED physician, shows Atrial fibrillation. Rate of 100. Mild lateral ST depression. No STEMI. QT mildly prolonged  -Initial troponin 36, repeat troponin 48, third troponin ordered.  Trend  -Third troponin 350- discussed with   [place], likely demand ischemia. Consult to cardiology placed, recommendations appreciated  -Last echo on 1/4/2023, shows EF of 55-60%. Echo ordered  -Pt states he has not taken his medications yet today  -Continue home medications as appropriate      QUENTIN  -Continue use of BiPAP, depending how patient progresses, can switch to use of CPAP at night per usual     Acute kidney injury  -Creatinine 1.59 today, 1.32 on 1/6/2023  -Defer fluids for now due to history of CHF  -Hold torsemide and decrease dose of allopurinol to 100mg  -Monitor with BMP in the a.m.     Anemia  -Hgb 11.7 today, 9.6 on 1/6/23  -Monitor with morning CBC   Patient fully evaluated on May 31.  Continue to monitor BUN and creatinine.  Elevated troponin might be demand ischemia.  Cardiology consultation obtained.  Repeat echo ordered.     I spent 60 minutes in the professional and overall care of this patient.        Rick Junior MD                         Patient fully evaluated on June 1. Appreciate pulmonary input. Continue plan as above. Repeat labs in AM.     Ileana Melendez

## 2024-06-01 NOTE — NURSING NOTE
Patient accidentally pulled out IV and refused another one to be placed.  called and made aware. Patient ok to not have IV access. Medications changed to PO.

## 2024-06-01 NOTE — CONSULTS
Reason For Consult  Chief complaints dyspnea and hypoxia    History Of Present Illness  Donnie Sims is a 89 y.o. male presenting with symptoms of shortness of breath and cough.  His symptoms started approximately 3 days prior to the current admission.  Yesterday patient became even more short of breath despite use of CPAP and oxygen at home.  He went to emergency room.  Was found to be in distress.  Patient was admitted for further management.  Patient has cough which is productive of yellowish phlegm.  Denies hemoptysis.  Denies fever chills or night sweats.  No arthralgias or myalgias.  No recent travel and no recent sick contacts.  Currently patient is on 5 L/min oxygen nasal cannula he feels somewhat better.     Past Medical History  He has a past medical history of Atrial fibrillation (Multi), BPH (benign prostatic hyperplasia), CHF (congestive heart failure) (Multi), COPD (chronic obstructive pulmonary disease) (Multi), Hyperlipidemia, Hypertension, and QUENTIN (obstructive sleep apnea).    Surgical History  He has a past surgical history that includes CT angio abdomen pelvis w and or wo IV IV contrast (1/3/2023).     Social History  He reports that he has quit smoking. His smoking use included cigarettes. He does not have any smokeless tobacco history on file. He reports current alcohol use. No history on file for drug use.    Family History  No family history on file.     Allergies  Patient has no known allergies.    Review of Systems  10 system review of systems performed and negative for any complaints outside of the ones mentioned in history of present illness.     Physical Exam  Head and face no deformities  Oropharynx normal mucosa  Neck is supple no thyromegaly  Chest is symmetric no crackles  Heart is regular no murmurs  Abdomen is soft and nontender  Skin is intact  Joints are normal  Neurologically patient is moving all 4 limbs.     Last Recorded Vitals  Blood pressure 116/59, pulse 78, temperature  "36.4 °C (97.5 °F), resp. rate 18, height 1.778 m (5' 10\"), weight 89.4 kg (197 lb), SpO2 97%.          Assessment/Plan     Acute on chronic respiratory failure most likely due to COPD exacerbation.  At baseline patient uses 3.5 L of oxygen at night and as needed during the day.  Currently he is on 5 L/min and also admission ABG demonstrated pCO2 level of 79 and pH of 7.26.    Atrial fibrillation.  Hypertension.  Congestive heart failure with elevated brain natruretic peptide.  Elevated troponin with suspicion for acute coronary syndrome.  Obstructive sleep apnea.    Plan:  Noninvasive ventilation at night and as needed during the day.  Titrate oxygen for saturation between 89 and 95%.  Bronchodilators as needed.  Steroids.  Monitor serial troponins and EKGs.  Try to maintain slightly negative fluid balance.  Monitor urine output.  Monitor serum creatinine electrolytes.             Gold Carnes MD    "

## 2024-06-02 VITALS
SYSTOLIC BLOOD PRESSURE: 128 MMHG | DIASTOLIC BLOOD PRESSURE: 62 MMHG | WEIGHT: 197 LBS | OXYGEN SATURATION: 95 % | TEMPERATURE: 96.4 F | HEIGHT: 70 IN | RESPIRATION RATE: 20 BRPM | BODY MASS INDEX: 28.2 KG/M2 | HEART RATE: 73 BPM

## 2024-06-02 LAB
CARDIAC TROPONIN I PNL SERPL HS: 135 NG/L (ref 0–20)
ERYTHROCYTE [DISTWIDTH] IN BLOOD BY AUTOMATED COUNT: 15.3 % (ref 11.5–14.5)
HCT VFR BLD AUTO: 33 % (ref 41–52)
HGB BLD-MCNC: 10.7 G/DL (ref 13.5–17.5)
HOLD SPECIMEN: NORMAL
MCH RBC QN AUTO: 31.2 PG (ref 26–34)
MCHC RBC AUTO-ENTMCNC: 32.4 G/DL (ref 32–36)
MCV RBC AUTO: 96 FL (ref 80–100)
NRBC BLD-RTO: 0.2 /100 WBCS (ref 0–0)
PLATELET # BLD AUTO: 172 X10*3/UL (ref 150–450)
RBC # BLD AUTO: 3.43 X10*6/UL (ref 4.5–5.9)
WBC # BLD AUTO: 12.7 X10*3/UL (ref 4.4–11.3)

## 2024-06-02 PROCEDURE — 85027 COMPLETE CBC AUTOMATED: CPT | Performed by: INTERNAL MEDICINE

## 2024-06-02 PROCEDURE — 2500000001 HC RX 250 WO HCPCS SELF ADMINISTERED DRUGS (ALT 637 FOR MEDICARE OP)

## 2024-06-02 PROCEDURE — 2500000002 HC RX 250 W HCPCS SELF ADMINISTERED DRUGS (ALT 637 FOR MEDICARE OP, ALT 636 FOR OP/ED): Mod: MUE | Performed by: INTERNAL MEDICINE

## 2024-06-02 PROCEDURE — 2500000004 HC RX 250 GENERAL PHARMACY W/ HCPCS (ALT 636 FOR OP/ED)

## 2024-06-02 PROCEDURE — 2500000004 HC RX 250 GENERAL PHARMACY W/ HCPCS (ALT 636 FOR OP/ED): Performed by: INTERNAL MEDICINE

## 2024-06-02 PROCEDURE — 2500000001 HC RX 250 WO HCPCS SELF ADMINISTERED DRUGS (ALT 637 FOR MEDICARE OP): Performed by: INTERNAL MEDICINE

## 2024-06-02 PROCEDURE — 97161 PT EVAL LOW COMPLEX 20 MIN: CPT | Mod: GP

## 2024-06-02 PROCEDURE — 99223 1ST HOSP IP/OBS HIGH 75: CPT | Performed by: INTERNAL MEDICINE

## 2024-06-02 PROCEDURE — 94640 AIRWAY INHALATION TREATMENT: CPT

## 2024-06-02 PROCEDURE — 84484 ASSAY OF TROPONIN QUANT: CPT | Performed by: INTERNAL MEDICINE

## 2024-06-02 PROCEDURE — 36415 COLL VENOUS BLD VENIPUNCTURE: CPT | Performed by: INTERNAL MEDICINE

## 2024-06-02 RX ORDER — L. ACIDOPHILUS/L.BULGARICUS 1MM CELL
1 TABLET ORAL 2 TIMES DAILY
Qty: 20 TABLET | Refills: 0 | Status: SHIPPED | OUTPATIENT
Start: 2024-06-02

## 2024-06-02 RX ORDER — PREDNISONE 10 MG/1
10 TABLET ORAL DAILY
Qty: 10 TABLET | Refills: 0 | Status: SHIPPED | OUTPATIENT
Start: 2024-06-02

## 2024-06-02 RX ORDER — ACETAMINOPHEN 325 MG/1
650 TABLET ORAL EVERY 4 HOURS PRN
Qty: 30 TABLET | Refills: 0 | Status: SHIPPED | OUTPATIENT
Start: 2024-06-02

## 2024-06-02 RX ORDER — LEVOFLOXACIN 750 MG/1
750 TABLET ORAL EVERY OTHER DAY
Qty: 5 TABLET | Refills: 0 | Status: SHIPPED | OUTPATIENT
Start: 2024-06-02 | End: 2024-06-12

## 2024-06-02 RX ADMIN — PREDNISONE 20 MG: 20 TABLET ORAL at 11:09

## 2024-06-02 RX ADMIN — IPRATROPIUM BROMIDE AND ALBUTEROL SULFATE 3 ML: .5; 3 SOLUTION RESPIRATORY (INHALATION) at 07:15

## 2024-06-02 RX ADMIN — HEPARIN SODIUM 5000 UNITS: 5000 INJECTION INTRAVENOUS; SUBCUTANEOUS at 11:09

## 2024-06-02 RX ADMIN — Medication 1 TABLET: at 11:09

## 2024-06-02 RX ADMIN — ALLOPURINOL 100 MG: 100 TABLET ORAL at 11:09

## 2024-06-02 RX ADMIN — ASPIRIN 81 MG: 81 TABLET, COATED ORAL at 11:09

## 2024-06-02 RX ADMIN — HEPARIN SODIUM 5000 UNITS: 5000 INJECTION INTRAVENOUS; SUBCUTANEOUS at 04:01

## 2024-06-02 RX ADMIN — IPRATROPIUM BROMIDE AND ALBUTEROL SULFATE 3 ML: .5; 3 SOLUTION RESPIRATORY (INHALATION) at 13:06

## 2024-06-02 RX ADMIN — METOPROLOL TARTRATE 25 MG: 25 TABLET, FILM COATED ORAL at 11:09

## 2024-06-02 RX ADMIN — POLYETHYLENE GLYCOL 3350 17 G: 17 POWDER, FOR SOLUTION ORAL at 09:00

## 2024-06-02 RX ADMIN — TORSEMIDE 20 MG: 20 TABLET ORAL at 11:09

## 2024-06-02 ASSESSMENT — COGNITIVE AND FUNCTIONAL STATUS - GENERAL: MOBILITY SCORE: 24

## 2024-06-02 ASSESSMENT — PAIN SCALES - GENERAL
PAINLEVEL_OUTOF10: 0 - NO PAIN
PAINLEVEL_OUTOF10: 0 - NO PAIN

## 2024-06-02 NOTE — PROGRESS NOTES
06/02/24 1055   Discharge Planning   Living Arrangements Spouse/significant other   Support Systems Spouse/significant other   Assistance Needed Independent, patients wife assists as needed, uses walker, has wheelchair. Patient does not drive, patients wife provides transportation   Type of Residence Private residence  (one story home)   Number of Stairs to Enter Residence   (ramp to enter home)   Home or Post Acute Services None   Patient expects to be discharged to: Home     Met with patient at bedside, introduced self and role on care transitions team. Admission assessment completed with patient. Address, insurance and contact information verified. Patient lives at home with his wife. PCP is Dr. Lanza. Patient has home oxygen and wears cpap at night. [facility] Patient has declined any home going needs and plans to return home at discharge.

## 2024-06-02 NOTE — DISCHARGE SUMMARY
Discharge Diagnosis  COPD exacerbation (Multi)    Issues Requiring Follow-Up  Patient fully evaluated on June 2. Repeat troponin shows downtrending, denies chest pain, agree related to demand ischemia. Cardiology cleared patient for discharge today. Patient and wife aware and agreeable to plan. Patient medically cleared for discharge today. Continue plan as below.    Discharge Meds     Your medication list        START taking these medications        Instructions Last Dose Given Next Dose Due   acetaminophen 325 mg tablet  Commonly known as: Tylenol      Take 2 tablets (650 mg) by mouth every 4 hours if needed for mild pain (1 - 3).       lactobacillus acidophilus tablet tablet      Take 1 tablet by mouth 2 times a day.       levoFLOXacin 750 mg tablet  Commonly known as: Levaquin      Take 1 tablet (750 mg) by mouth every other day for 10 days.       predniSONE 10 mg tablet  Commonly known as: Deltasone      Take 1 tablet (10 mg) by mouth once daily.              CONTINUE taking these medications        Instructions Last Dose Given Next Dose Due   albuterol 90 mcg/actuation inhaler           allopurinol 300 mg tablet  Commonly known as: Zyloprim           aspirin 81 mg EC tablet           atorvastatin 80 mg tablet  Commonly known as: Lipitor           cholecalciferol 50,000 unit capsule  Commonly known as: Vitamin D-3           Eucerin cream  Generic drug: eucerin           fluticasone 50 mcg/actuation nasal spray  Commonly known as: Flonase           guaiFENesin 400 mg tablet  Commonly known as: Humibid 3           metoprolol tartrate 25 mg tablet  Commonly known as: Lopressor           oxygen gas therapy  Commonly known as: O2           torsemide 20 mg tablet  Commonly known as: Demadex                     Where to Get Your Medications        These medications were sent to [id number] - [place], [place] - [address]  [address] [address]      Phone: [phone]   acetaminophen 325 mg  tablet  lactobacillus acidophilus tablet tablet  levoFLOXacin 750 mg tablet  predniSONE 10 mg tablet         Test Results Pending At Discharge  Pending Labs       Order Current Status    Lactate Collected (05/31/24 0045)            Hospital Course   Patient fully evaluated on June 2. Repeat troponin shows downtrending, denies chest pain, agree related to demand ischemia. Cardiology cleared patient for discharge today. Patient and wife aware and agreeable to plan. Patient medically cleared for discharge today. Continue plan as above.    Pertinent Physical Exam At Time of Discharge  Physical Exam    Outpatient Follow-Up  No future appointments.      Ileana Melendez

## 2024-06-02 NOTE — CONSULTS
Consults  History Of Present Illness:    Donnie Sims is a 89 y.o. male presenting with SOB.    Patient is a 89-year-old male with a history of HFpEF, paroxysmal atrial fibrillation, hypertension, hyperlipidemia, CAD status post remote stent, COPD, QUENTIN, BPH who presents with shortness of breath and hypoxia.  Patient was found to have an oxygen saturation that decreased.  He is chronically on 2 to 3 L of oxygen by nasal cannula.  This is increased to 5 L with improvement in his saturations.  He otherwise denies any chest discomfort.  He denies any palpitations, lightheadedness, syncope, orthopnea, PND, lower extremity edema.  States he takes his diuretic based on weight.    Twelve-lead ECG demonstrates atrial fibrillation, incomplete left bundle branch block.  BNP is 739.  Creatinine 1.59.  Troponin 36, 48, 350.  January 2023 echocardiogram with an ejection fraction of 55 to 60%, mild to moderate mitral regurgitation, mild aortic regurgitation.     Last Recorded Vitals:  Vitals:    06/01/24 2341 06/02/24 0347 06/02/24 0715 06/02/24 0744   BP: 118/55 (!) 117/94  126/60   Pulse: 77 90  80   Resp: 18 18  20   Temp: 36.1 °C (97 °F) 37 °C (98.6 °F)  35.5 °C (95.9 °F)   TempSrc:    Temporal   SpO2: (!) 89% 90% 94% 92%   Weight:       Height:           Last Labs:  CBC - 6/1/2024:  5:40 AM  16.0 11.0 170    34.5      CMP - 6/1/2024:  5:40 AM  9.2 6.1 19 --- 0.4   _ 3.6 10 102      PTT - No results in last year.  _   _ _     Troponin I, High Sensitivity   Date/Time Value Ref Range Status   05/31/2024 10:08  (HH) 0 - 20 ng/L Final   05/31/2024 01:57 AM 48 (H) 0 - 20 ng/L Final   05/31/2024 12:45 AM 36 (H) 0 - 20 ng/L Final     BNP   Date/Time Value Ref Range Status   05/31/2024 12:45  (H) 0 - 99 pg/mL Final   01/03/2023 05:26  (H) 0 - 99 pg/mL Final     Comment:     .  <100 pg/mL - Heart failure unlikely  100-299 pg/mL - Intermediate probability of acute heart  .               failure exacerbation.  "Correlate with clinical  .               context and patient history.    >=300 pg/mL - Heart Failure likely. Correlate with clinical  .               context and patient history.  BNP testing is performed using different testing   methodology at [facility] than at other   system hospitals. Direct result comparisons should   only be made within the same method.     06/12/2022 11:16  (H) 0 - 99 pg/mL Final     Comment:     .  <100 pg/mL - Heart failure unlikely  100-299 pg/mL - Intermediate probability of acute heart  .               failure exacerbation. Correlate with clinical  .               context and patient history.    >=300 pg/mL - Heart Failure likely. Correlate with clinical  .               context and patient history.  BNP testing is performed using different testing   methodology at [facility] than at other   system hospitals. Direct result comparisons should   only be made within the same method.        Last I/O:  I/O last 3 completed shifts:  In: 500 (5.6 mL/kg) [IV Piggyback:500]  Out: - (0 mL/kg)   Weight: 89.4 kg     Past Cardiology Tests (Last 3 Years):  EKG:  No results found for this or any previous visit from the past 1095 days.    Echo:  No results found for this or any previous visit from the past 1095 days.    Ejection Fractions:  No results found for: \"EF\"  Cath:  No results found for this or any previous visit from the past 1095 days.    Stress Test:  No results found for this or any previous visit from the past 1095 days.    Cardiac Imaging:  No results found for this or any previous visit from the past 1095 days.      Past Medical History:  He has a past medical history of Atrial fibrillation (Multi), BPH (benign prostatic hyperplasia), CHF (congestive heart failure) (Multi), COPD (chronic obstructive pulmonary disease) (Multi), Hyperlipidemia, Hypertension, and QUENTIN (obstructive sleep apnea).    Past Surgical History:  He has a past surgical history that " includes CT angio abdomen pelvis w and or wo IV IV contrast (1/3/2023).      Social History:  He reports that he has quit smoking. His smoking use included cigarettes. He does not have any smokeless tobacco history on file. He reports current alcohol use. No history on file for drug use.    Family History:  No family history on file.     Allergies:  Patient has no known allergies.    Inpatient Medications:  Scheduled medications   Medication Dose Route Frequency    allopurinol  100 mg oral Daily    aspirin  81 mg oral Daily    atorvastatin  40 mg oral Nightly    heparin (porcine)  5,000 Units subcutaneous q8h    ipratropium-albuteroL  3 mL nebulization TID    lactobacillus acidophilus  1 tablet oral BID    levoFLOXacin  750 mg oral q48h    metoprolol tartrate  25 mg oral BID    polyethylene glycol  17 g oral Daily    predniSONE  20 mg oral Daily    white petrolatum   Topical Daily     PRN medications   Medication    acetaminophen    albuterol    fluticasone    guaiFENesin    oxygen    oxygen    torsemide     Continuous Medications   Medication Dose Last Rate     Outpatient Medications:  Current Outpatient Medications   Medication Instructions    acetaminophen (TYLENOL) 650 mg, oral, Every 4 hours PRN    albuterol 90 mcg/actuation inhaler 2 puffs, inhalation, Every 6 hours PRN    allopurinol (Zyloprim) 300 mg tablet 1 tablet, oral, Daily    aspirin 81 mg EC tablet 1 tablet, oral, Daily    atorvastatin (Lipitor) 80 mg tablet 0.5 tablets, oral, Nightly    cholecalciferol (VITAMIN D-3) 50,000 Units, oral, Every 30 days    Eucerin cream 1 Application, Topical, Daily, Apply to bilateral feet    fluticasone (Flonase) 50 mcg/actuation nasal spray 1 spray, Each Nostril, Daily PRN, Shake gently. Before first use, prime pump. After use, clean tip and replace cap.    guaiFENesin (HUMIBID 3) 400 mg, oral, Every 4 hours PRN    lactobacillus acidophilus tablet tablet 1 tablet, oral, 2 times daily    levoFLOXacin (LEVAQUIN) 750 mg,  oral, Every other day    metoprolol tartrate (Lopressor) 25 mg tablet 1 tablet, oral, 2 times daily    oxygen (O2) gas therapy inhalation, Continuous    predniSONE (DELTASONE) 10 mg, oral, Daily    torsemide (Demadex) 20 mg tablet 1-2 tablets, oral, Daily PRN       Physical Exam:  Physical Exam  Vitals reviewed.   Constitutional:       Appearance: Normal appearance.   HENT:      Head: Normocephalic and atraumatic.      Mouth/Throat:      Mouth: Mucous membranes are moist.      Pharynx: Oropharynx is clear.   Eyes:      Extraocular Movements: Extraocular movements intact.      Conjunctiva/sclera: Conjunctivae normal.   Neck:      Vascular: No JVD.      Comments: JVP 8  Cardiovascular:      Rate and Rhythm: Normal rate. Rhythm irregular.      Pulses: Normal pulses.      Heart sounds: Normal heart sounds.   Pulmonary:      Effort: Pulmonary effort is normal.      Breath sounds: Normal breath sounds.   Abdominal:      General: Bowel sounds are normal.      Palpations: Abdomen is soft.   Musculoskeletal:         General: No swelling.      Cervical back: Neck supple.   Skin:     General: Skin is warm and dry.   Neurological:      General: No focal deficit present.      Mental Status: He is alert.   Psychiatric:         Mood and Affect: Mood normal.         Behavior: Behavior normal.           Assessment/Plan   6/2/2024  1.  Shortness of breath: Unclear etiology.  Does not appear to be in heart failure.  JVP is 8, lungs are clear, no lower extremity edema.  Chest x-ray is clear.  2.  History of HFpEF: Takes diuretic when needed.  Has underlying kidney disease.  Revised continue beta-blockade and as needed diuretic.  Echocardiogram is pending.  3.  Remote history of CAD: Continue statin therapy.  4.  Elevated troponin: Patient's asymptomatic from cardiac standpoint.  Suspect this is related to demand from the patient's hypoxia and atrial fibrillation.  Given the patient's advanced age and multiple medical problems, would be  conservative in his cardiac care at this time.  Echocardiogram is pending.  5.  Disposition: If the patient's next troponin has decreased and he is symptomatically better, he would be stable for discharge from a cardiac standpoint if he desires to go home today.  Unlikely to be able to get an echocardiogram done on Sunday.    Code Status:  Full Code    Benny Bose MD

## 2024-06-02 NOTE — PROGRESS NOTES
"Donnie Sims is a 89 y.o. male on day 2 of admission presenting with COPD exacerbation (Multi).    Subjective   Patient looks and feels better today.  Dyspnea has improved somewhat.  No fever or chills.       Objective     Physical Exam    Head and face no deformities  Oropharynx normal mucosa  Neck is supple no thyromegaly  Chest is symmetric no crackles or wheezing  Heart is regular no murmurs  Abdomen is soft and nontender  Skin is intact  Joints are normal  Neurologically patient is moving all 4 limbs.    Last Recorded Vitals  Blood pressure 126/60, pulse 80, temperature 35.5 °C (95.9 °F), temperature source Temporal, resp. rate 20, height 1.778 m (5' 10\"), weight 89.4 kg (197 lb), SpO2 92%.  Intake/Output last 3 Shifts:  I/O last 3 completed shifts:  In: 500 (5.6 mL/kg) [IV Piggyback:500]  Out: - (0 mL/kg)   Weight: 89.4 kg                       Assessment/Plan      Acute on chronic respiratory failure.  Patient has chronic hypoxia and at baseline uses 3.5 L of oxygen at night and occasionally during the day.  Oxygen requirements are improving.  Atrial fibrillation.  Hypertension.  Congestive heart failure with left ventricular diastolic dysfunction.  Obstructive sleep apnea.    Plan:  Encourage use of CPAP/BiPAP at night.  Continue oxygen and titrate for saturation between 89 and 95%.  Taper off steroids.  Ambulate as tolerated.  DVT prophylaxis.  Bronchodilators as needed.      Gold Carnes MD      "

## 2024-06-02 NOTE — PROGRESS NOTES
Physical Therapy    Physical Therapy Evaluation    Patient Name: Donnie Sims  MRN: [id number]  Today's Date: 6/2/2024   Time Calculation  Start Time: 1025  Stop Time: 1040  Time Calculation (min): 15 min    Assessment/Plan   PT Assessment  End of Session Communication: Care Coordinator, Bedside nurse  End of Session Patient Position: Bed, 2 rail up, Alarm off, not on at start of session  IP OR SWING BED PT PLAN  Inpatient or Swing Bed: Inpatient  PT Plan  PT Eval Only Reason: Safe to return home  PT - OK to Discharge: Yes      Subjective   General Visit Information:  General  Reason for Referral: Exac COPD,BECKY,anemia,acute resp failure  Referred By: COURT Watson PA-C  Past Medical History Relevant to Rehab: COPD,[place],CHF,QUENTIN/CPAP.HTN,A-fib  Prior to Session Communication: Bedside nurse  Patient Position Received: Bed, 2 rail up, Alarm off, not on at start of session  Home Living:  Home Living  Type of Home: House  Lives With: Spouse  Home Adaptive Equipment: Walker rolling or standard, Cane, Wheelchair-manual (basement power stair lift)  Home Layout: One level  Home Access: Elevator  Prior Level of Function:  Prior Function Per Pt/Caregiver Report  Level of [place]: Independent with ADLs and functional transfers, Needs assistance with ADLs (wife does socks)  Ambulatory Assistance:  (Ind wh walker in home;W/C assist community 2/2 breathing/endurance)  Precautions:  Precautions  Medical Precautions: Fall precautions, Oxygen therapy device and L/min  Vital Signs:       Objective   Pain:  Pain Assessment  Pain Score: 0 - No pain  Cognition:  Cognition  Overall Cognitive Status: Within Functional Limits    General Assessments:    Functional Assessments:  Bed Mobility  Bed Mobility:  (Ind)    Transfers  Transfer:  (Ind)    Ambulation/Gait Training  Ambulation/Gait Training Performed:  (Ind wh walker 40 ft)  Extremity/Trunk Assessments:    Outcome Measures:  [facility] Basic Mobility  Turning from your back to your side while  in a flat bed without using bedrails: None  Moving from lying on your back to sitting on the side of a flat bed without using bedrails: None  Moving to and from bed to chair (including a wheelchair): None  Standing up from a chair using your arms (e.g. wheelchair or bedside chair): None  To walk in hospital room: None  Climbing 3-5 steps with railing: None  Basic Mobility - Total Score: 24    Encounter Problems       Encounter Problems (Resolved)       Pain - Adult              Education Documentation  No documentation found.  Education Comments  No comments found.

## 2024-06-05 LAB
ATRIAL RATE: 89 BPM
Q ONSET: 213 MS
QRS COUNT: 15 BEATS
QRS DURATION: 126 MS
QT INTERVAL: 338 MS
QTC CALCULATION(BAZETT): 427 MS
QTC FREDERICIA: 395 MS
R AXIS: -8 DEGREES
T AXIS: 198 DEGREES
T OFFSET: 382 MS
VENTRICULAR RATE: 96 BPM

## 2024-06-08 LAB
Q ONSET: 217 MS
QRS COUNT: 16 BEATS
QRS DURATION: 110 MS
QT INTERVAL: 364 MS
QTC CALCULATION(BAZETT): 469 MS
QTC FREDERICIA: 431 MS
R AXIS: 36 DEGREES
T AXIS: 153 DEGREES
T OFFSET: 399 MS
VENTRICULAR RATE: 100 BPM

## 2025-03-17 ENCOUNTER — HOSPITAL ENCOUNTER (EMERGENCY)
Facility: HOSPITAL | Age: OVER 89
Discharge: OTHER INSTITUTION | End: 2025-03-17
Attending: STUDENT IN AN ORGANIZED HEALTH CARE EDUCATION/TRAINING PROGRAM
Payer: MEDICARE

## 2025-03-17 ENCOUNTER — APPOINTMENT (OUTPATIENT)
Dept: RADIOLOGY | Facility: HOSPITAL | Age: OVER 89
End: 2025-03-17
Payer: MEDICARE

## 2025-03-17 ENCOUNTER — APPOINTMENT (OUTPATIENT)
Dept: CARDIOLOGY | Facility: HOSPITAL | Age: OVER 89
End: 2025-03-17
Payer: MEDICARE

## 2025-03-17 VITALS
BODY MASS INDEX: 26.68 KG/M2 | SYSTOLIC BLOOD PRESSURE: 112 MMHG | RESPIRATION RATE: 24 BRPM | WEIGHT: 197 LBS | HEART RATE: 96 BPM | HEIGHT: 72 IN | DIASTOLIC BLOOD PRESSURE: 55 MMHG | OXYGEN SATURATION: 98 % | TEMPERATURE: 96.8 F

## 2025-03-17 DIAGNOSIS — J44.1 COPD EXACERBATION (MULTI): ICD-10-CM

## 2025-03-17 DIAGNOSIS — R06.02 SHORTNESS OF BREATH: Primary | ICD-10-CM

## 2025-03-17 DIAGNOSIS — I71.40 ABDOMINAL AORTIC ANEURYSM (AAA) WITHOUT RUPTURE, UNSPECIFIED PART: ICD-10-CM

## 2025-03-17 DIAGNOSIS — T82.310A TYPE III ENDOLEAK OF AORTIC GRAFT: ICD-10-CM

## 2025-03-17 DIAGNOSIS — R10.84 GENERALIZED ABDOMINAL PAIN: ICD-10-CM

## 2025-03-17 DIAGNOSIS — R79.89 ELEVATED TROPONIN: ICD-10-CM

## 2025-03-17 LAB
ABO GROUP (TYPE) IN BLOOD: NORMAL
ALBUMIN SERPL BCP-MCNC: 3.6 G/DL (ref 3.4–5)
ALP SERPL-CCNC: 123 U/L (ref 33–136)
ALT SERPL W P-5'-P-CCNC: 13 U/L (ref 10–52)
ANION GAP BLDV CALCULATED.4IONS-SCNC: 8 MMOL/L (ref 10–25)
ANION GAP SERPL CALC-SCNC: 10 MMOL/L (ref 10–20)
ANTIBODY SCREEN: NORMAL
APTT PPP: 29 SECONDS (ref 26–36)
AST SERPL W P-5'-P-CCNC: 19 U/L (ref 9–39)
BASE EXCESS BLDV CALC-SCNC: 3.9 MMOL/L (ref -2–3)
BASOPHILS # BLD AUTO: 0.02 X10*3/UL (ref 0–0.1)
BASOPHILS NFR BLD AUTO: 0.2 %
BILIRUB SERPL-MCNC: 0.6 MG/DL (ref 0–1.2)
BNP SERPL-MCNC: 939 PG/ML (ref 0–99)
BODY TEMPERATURE: 37 DEGREES CELSIUS
BUN SERPL-MCNC: 32 MG/DL (ref 6–23)
CA-I BLDV-SCNC: 1.17 MMOL/L (ref 1.1–1.33)
CALCIUM SERPL-MCNC: 8.9 MG/DL (ref 8.6–10.3)
CARDIAC TROPONIN I PNL SERPL HS: 108 NG/L (ref 0–20)
CARDIAC TROPONIN I PNL SERPL HS: 47 NG/L (ref 0–20)
CHLORIDE BLDV-SCNC: 103 MMOL/L (ref 98–107)
CHLORIDE SERPL-SCNC: 101 MMOL/L (ref 98–107)
CO2 SERPL-SCNC: 32 MMOL/L (ref 21–32)
CREAT SERPL-MCNC: 1.05 MG/DL (ref 0.5–1.3)
EGFRCR SERPLBLD CKD-EPI 2021: 67 ML/MIN/1.73M*2
EOSINOPHIL # BLD AUTO: 0.28 X10*3/UL (ref 0–0.4)
EOSINOPHIL NFR BLD AUTO: 3 %
ERYTHROCYTE [DISTWIDTH] IN BLOOD BY AUTOMATED COUNT: 14 % (ref 11.5–14.5)
FLUAV RNA RESP QL NAA+PROBE: NOT DETECTED
FLUBV RNA RESP QL NAA+PROBE: NOT DETECTED
GLUCOSE BLDV-MCNC: 147 MG/DL (ref 74–99)
GLUCOSE SERPL-MCNC: 145 MG/DL (ref 74–99)
HCO3 BLDV-SCNC: 31.1 MMOL/L (ref 22–26)
HCT VFR BLD AUTO: 34.3 % (ref 41–52)
HCT VFR BLD EST: 34 % (ref 41–52)
HGB BLD-MCNC: 10.6 G/DL (ref 13.5–17.5)
HGB BLDV-MCNC: 11.2 G/DL (ref 13.5–17.5)
IMM GRANULOCYTES # BLD AUTO: 0.04 X10*3/UL (ref 0–0.5)
IMM GRANULOCYTES NFR BLD AUTO: 0.4 % (ref 0–0.9)
INHALED O2 CONCENTRATION: 60 %
INR PPP: 1.1 (ref 0.9–1.1)
LACTATE BLDV-SCNC: 1.7 MMOL/L (ref 0.4–2)
LACTATE SERPL-SCNC: 1.6 MMOL/L (ref 0.4–2)
LIPASE SERPL-CCNC: 26 U/L (ref 9–82)
LYMPHOCYTES # BLD AUTO: 1.07 X10*3/UL (ref 0.8–3)
LYMPHOCYTES NFR BLD AUTO: 11.6 %
MAGNESIUM SERPL-MCNC: 2.34 MG/DL (ref 1.6–2.4)
MCH RBC QN AUTO: 30.4 PG (ref 26–34)
MCHC RBC AUTO-ENTMCNC: 30.9 G/DL (ref 32–36)
MCV RBC AUTO: 98 FL (ref 80–100)
MONOCYTES # BLD AUTO: 1.01 X10*3/UL (ref 0.05–0.8)
MONOCYTES NFR BLD AUTO: 11 %
NEUTROPHILS # BLD AUTO: 6.8 X10*3/UL (ref 1.6–5.5)
NEUTROPHILS NFR BLD AUTO: 73.8 %
NRBC BLD-RTO: 0 /100 WBCS (ref 0–0)
OXYHGB MFR BLDV: 20 % (ref 45–75)
PCO2 BLDV: 59 MM HG (ref 41–51)
PH BLDV: 7.33 PH (ref 7.33–7.43)
PLATELET # BLD AUTO: 184 X10*3/UL (ref 150–450)
PO2 BLDV: 18 MM HG (ref 35–45)
POTASSIUM BLDV-SCNC: 5.5 MMOL/L (ref 3.5–5.3)
POTASSIUM SERPL-SCNC: 5.1 MMOL/L (ref 3.5–5.3)
PROT SERPL-MCNC: 6.6 G/DL (ref 6.4–8.2)
PROTHROMBIN TIME: 11.7 SECONDS (ref 9.8–12.4)
RBC # BLD AUTO: 3.49 X10*6/UL (ref 4.5–5.9)
RH FACTOR (ANTIGEN D): NORMAL
SAO2 % BLDV: 20 % (ref 45–75)
SARS-COV-2 RNA RESP QL NAA+PROBE: NOT DETECTED
SODIUM BLDV-SCNC: 137 MMOL/L (ref 136–145)
SODIUM SERPL-SCNC: 138 MMOL/L (ref 136–145)
WBC # BLD AUTO: 9.2 X10*3/UL (ref 4.4–11.3)

## 2025-03-17 PROCEDURE — 94660 CPAP INITIATION&MGMT: CPT

## 2025-03-17 PROCEDURE — 83735 ASSAY OF MAGNESIUM: CPT | Performed by: NURSE PRACTITIONER

## 2025-03-17 PROCEDURE — 85610 PROTHROMBIN TIME: CPT | Performed by: NURSE PRACTITIONER

## 2025-03-17 PROCEDURE — 93005 ELECTROCARDIOGRAM TRACING: CPT

## 2025-03-17 PROCEDURE — 96374 THER/PROPH/DIAG INJ IV PUSH: CPT | Mod: 59

## 2025-03-17 PROCEDURE — 84132 ASSAY OF SERUM POTASSIUM: CPT | Performed by: NURSE PRACTITIONER

## 2025-03-17 PROCEDURE — 74177 CT ABD & PELVIS W/CONTRAST: CPT | Performed by: RADIOLOGY

## 2025-03-17 PROCEDURE — 96375 TX/PRO/DX INJ NEW DRUG ADDON: CPT | Mod: 59

## 2025-03-17 PROCEDURE — 96376 TX/PRO/DX INJ SAME DRUG ADON: CPT | Mod: 59

## 2025-03-17 PROCEDURE — 86901 BLOOD TYPING SEROLOGIC RH(D): CPT | Performed by: NURSE PRACTITIONER

## 2025-03-17 PROCEDURE — 83690 ASSAY OF LIPASE: CPT | Performed by: NURSE PRACTITIONER

## 2025-03-17 PROCEDURE — 87636 SARSCOV2 & INF A&B AMP PRB: CPT | Performed by: NURSE PRACTITIONER

## 2025-03-17 PROCEDURE — 36415 COLL VENOUS BLD VENIPUNCTURE: CPT | Performed by: NURSE PRACTITIONER

## 2025-03-17 PROCEDURE — 71045 X-RAY EXAM CHEST 1 VIEW: CPT

## 2025-03-17 PROCEDURE — 94640 AIRWAY INHALATION TREATMENT: CPT

## 2025-03-17 PROCEDURE — 85025 COMPLETE CBC W/AUTO DIFF WBC: CPT | Performed by: NURSE PRACTITIONER

## 2025-03-17 PROCEDURE — 83880 ASSAY OF NATRIURETIC PEPTIDE: CPT | Performed by: NURSE PRACTITIONER

## 2025-03-17 PROCEDURE — 2550000001 HC RX 255 CONTRASTS: Performed by: STUDENT IN AN ORGANIZED HEALTH CARE EDUCATION/TRAINING PROGRAM

## 2025-03-17 PROCEDURE — 71045 X-RAY EXAM CHEST 1 VIEW: CPT | Performed by: RADIOLOGY

## 2025-03-17 PROCEDURE — 2500000004 HC RX 250 GENERAL PHARMACY W/ HCPCS (ALT 636 FOR OP/ED): Mod: JZ | Performed by: NURSE PRACTITIONER

## 2025-03-17 PROCEDURE — 83605 ASSAY OF LACTIC ACID: CPT | Performed by: NURSE PRACTITIONER

## 2025-03-17 PROCEDURE — 84132 ASSAY OF SERUM POTASSIUM: CPT | Mod: 59 | Performed by: NURSE PRACTITIONER

## 2025-03-17 PROCEDURE — 85730 THROMBOPLASTIN TIME PARTIAL: CPT | Performed by: NURSE PRACTITIONER

## 2025-03-17 PROCEDURE — 99291 CRITICAL CARE FIRST HOUR: CPT | Mod: 25 | Performed by: NURSE PRACTITIONER

## 2025-03-17 PROCEDURE — 2500000005 HC RX 250 GENERAL PHARMACY W/O HCPCS: Performed by: STUDENT IN AN ORGANIZED HEALTH CARE EDUCATION/TRAINING PROGRAM

## 2025-03-17 PROCEDURE — 2500000002 HC RX 250 W HCPCS SELF ADMINISTERED DRUGS (ALT 637 FOR MEDICARE OP, ALT 636 FOR OP/ED): Performed by: NURSE PRACTITIONER

## 2025-03-17 PROCEDURE — 71275 CT ANGIOGRAPHY CHEST: CPT

## 2025-03-17 PROCEDURE — 84484 ASSAY OF TROPONIN QUANT: CPT | Performed by: NURSE PRACTITIONER

## 2025-03-17 PROCEDURE — 74177 CT ABD & PELVIS W/CONTRAST: CPT

## 2025-03-17 PROCEDURE — 2500000005 HC RX 250 GENERAL PHARMACY W/O HCPCS: Performed by: NURSE PRACTITIONER

## 2025-03-17 PROCEDURE — 71275 CT ANGIOGRAPHY CHEST: CPT | Performed by: RADIOLOGY

## 2025-03-17 RX ORDER — ESMOLOL HYDROCHLORIDE 10 MG/ML
50-300 INJECTION, SOLUTION INTRAVENOUS CONTINUOUS
Status: DISCONTINUED | OUTPATIENT
Start: 2025-03-17 | End: 2025-03-17 | Stop reason: HOSPADM

## 2025-03-17 RX ORDER — BUDESONIDE 0.5 MG/2ML
0.5 INHALANT ORAL
COMMUNITY

## 2025-03-17 RX ORDER — CHOLECALCIFEROL (VITAMIN D3) 50 MCG
50 TABLET ORAL DAILY
COMMUNITY

## 2025-03-17 RX ORDER — IPRATROPIUM BROMIDE AND ALBUTEROL SULFATE 2.5; .5 MG/3ML; MG/3ML
3 SOLUTION RESPIRATORY (INHALATION) ONCE
Status: COMPLETED | OUTPATIENT
Start: 2025-03-17 | End: 2025-03-17

## 2025-03-17 RX ORDER — MORPHINE SULFATE 4 MG/ML
4 INJECTION, SOLUTION INTRAMUSCULAR; INTRAVENOUS ONCE
Status: COMPLETED | OUTPATIENT
Start: 2025-03-17 | End: 2025-03-17

## 2025-03-17 RX ADMIN — METHYLPREDNISOLONE SODIUM SUCCINATE 125 MG: 125 INJECTION, POWDER, FOR SOLUTION INTRAMUSCULAR; INTRAVENOUS at 18:06

## 2025-03-17 RX ADMIN — Medication 60 PERCENT: at 19:47

## 2025-03-17 RX ADMIN — MORPHINE SULFATE 4 MG: 4 INJECTION, SOLUTION INTRAMUSCULAR; INTRAVENOUS at 19:26

## 2025-03-17 RX ADMIN — ESMOLOL HYDROCHLORIDE 50 MCG/KG/MIN: 10 INJECTION INTRAVENOUS at 19:29

## 2025-03-17 RX ADMIN — Medication 60 PERCENT: at 17:30

## 2025-03-17 RX ADMIN — IPRATROPIUM BROMIDE AND ALBUTEROL SULFATE 3 ML: .5; 3 SOLUTION RESPIRATORY (INHALATION) at 17:50

## 2025-03-17 RX ADMIN — IOHEXOL 80 ML: 350 INJECTION, SOLUTION INTRAVENOUS at 19:08

## 2025-03-17 RX ADMIN — ESMOLOL HYDROCHLORIDE 250 MCG/KG/MIN: 10 INJECTION INTRAVENOUS at 20:46

## 2025-03-17 ASSESSMENT — LIFESTYLE VARIABLES
HAVE PEOPLE ANNOYED YOU BY CRITICIZING YOUR DRINKING: NO
HAVE YOU EVER FELT YOU SHOULD CUT DOWN ON YOUR DRINKING: NO
EVER HAD A DRINK FIRST THING IN THE MORNING TO STEADY YOUR NERVES TO GET RID OF A HANGOVER: NO
TOTAL SCORE: 0
EVER FELT BAD OR GUILTY ABOUT YOUR DRINKING: NO

## 2025-03-17 ASSESSMENT — PAIN - FUNCTIONAL ASSESSMENT: PAIN_FUNCTIONAL_ASSESSMENT: 0-10

## 2025-03-17 ASSESSMENT — PAIN SCALES - GENERAL: PAINLEVEL_OUTOF10: 10 - WORST POSSIBLE PAIN

## 2025-03-17 NOTE — ED TRIAGE NOTES
PATIENT COMES IN WITH COMPLAINTS OF ABDOMINAL PAIN AND SHORTNESS OF BREATH FOR A COUPLE OF DAYS. THE PATIENT HAS A HISTORY OF COPD. THE PATIENT WAS PLACED ON 15 L NONBREATHER THE SPO2 WAS IN THE HIGH 80's. UPON ARRIVAL THE PATIENT IS ALERT AND ORIENTED AND PLACED ON HIGH FLOW O2. THE PATIENT DENIES CHEST PAIN

## 2025-03-17 NOTE — ED PROVIDER NOTES
EMERGENCY DEPARTMENT ENCOUNTER      Pt Name: Donnie Sims  MRN: [id number]  Birthdate 9/30/1933  Date of evaluation: 3/17/2025  Provider: URIEL Powell    CHIEF COMPLAINT       Chief Complaint   Patient presents with    Abdominal Pain    Shortness of Breath       HISTORY OF PRESENT ILLNESS    Donnie Sims is a 89 y.o. male who presents to the emergency department via EMS with complaint of shortness of breath the last couple days and sudden onset abdominal pain morning upon awakening.  Patient states that he used his nebulizer and CPAP at night without improvement of his symptoms.  He states that he does wear 2 to 4 L nasal cannula supplemental oxygenation at baseline, had to increase it to a consistent 4 L nasal cannula but still felt short of breath therefore he called 911.  States that he has an associated productive cough with white sputum but is unsure if this is different than his normal.  EMS found the patient saturating the high 70s on 4 L nasal cannula and placed him on nonrebreather with improvement to mid 80s.  He states that he takes his daily medications compliantly.  Denies any cold symptoms.  Denies any history abdominal surgery.  Denies any trauma, fall, injury, anticoagulation use, recent travel, large crowds, known sick contacts, smoking, drugs or alcohol.  Denies any fevers, chills, nausea, vomiting, diarrhea, urinary symptoms, lower extremity swelling or any additional symptoms or complaints at this time.    Nursing Notes were reviewed.    History provided by patient/EMS.  No  used.    REVIEW OF SYSTEMS     ROS is otherwise negative unless stated above.    PAST MEDICAL HISTORY     Past Medical History:   Diagnosis Date    Atrial fibrillation (Multi)     BPH (benign prostatic hyperplasia)     CHF (congestive heart failure) (Multi)     COPD (chronic obstructive pulmonary disease) (Multi)     Hyperlipidemia     Hypertension     QUENTIN (obstructive sleep apnea)         SURGICAL HISTORY       Past Surgical History:   Procedure Laterality Date    CT ABDOMEN PELVIS ANGIOGRAM W AND/OR WO IV CONTRAST  1/3/2023    CT ABDOMEN PELVIS ANGIOGRAM W AND/OR WO IV CONTRAST 1/3/2023 DOCTOR OFFICE [facility]GAC[facility]       ALLERGIES     Patient has no known allergies.    FAMILY HISTORY     No family history on file.     SOCIAL HISTORY       Social History     Socioeconomic History    Marital status:    Tobacco Use    Smoking status: Former     Types: Cigarettes   Substance and Sexual Activity    Alcohol use: Yes     Social Drivers of Health     Financial Resource Strain: Low Risk  (5/31/2024)    Overall Financial Resource Strain (CARDIA)     Difficulty of Paying Living Expenses: Not hard at all   Transportation Needs: No Transportation Needs (5/31/2024)    PRAPARE - Transportation     Lack of Transportation (Medical): No     Lack of Transportation (Non-Medical): No   Housing Stability: Low Risk  (5/31/2024)    Housing Stability Vital Sign     Unable to Pay for Housing in the Last Year: No     Number of Places Lived in the Last Year: 1     Unstable Housing in the Last Year: No       PHYSICAL EXAM   VS: As documented in the triage note and EMR flowsheet from this visit were reviewed.    GEN: Elderly, chronically ill-appearing, sitting in ER cart with mild dyspnea on exertion  EYES:  EOMs grossly intact, anicteric sclera, no nystagmus noted, clear and equal bilaterally, no foreign body noted  HEENT: Airway patent  CARD: RRR, nontender chest, no crepitus deformities, no JVD, no murmurs rubs or gallops ; No edema noted.  Positive pulses bilaterally throughout.  Capillary refill less than 3 seconds.  No abnormal redness, warmth, tenderness or swelling noted to bilateral lower extremities.  PULMONARY: diminished all lung fields. Moving air well, Nonlabored, no accessory muscle use, able to speak complete sentences, tachypnea noted  ABDOMEN: Abdomen soft, distended, no rebound, no guarding. Bowel sounds  normal in all 4 quadrants.  Generalized tenderness to palpation.  No CVA tenderness.  No masses or organomegaly noted.  No evidence of peritonitis. Negative Alvarado's and McBurney's point tenderness.   : deferred  MUSK: Spine appears normal, range of motion is not limited, no muscle or joint tenderness. Strength 5 out of 5 equal bilaterally throughout.  Generalized weakness noted.  No step-offs, deformities or additional signs of trauma noted.  No spinal/midline tenderness to palpation  SKIN: Skin normal color for race, warm, dry and intact. No evidence of trauma. No rash noted.  NEURO: Alert and oriented x 3, speech is clear, no obvious deficits noted.  GCS 15  PSYCH: Alert and oriented to person, place, time/situation. normal mood and affect. No apparent risk to self or others. Thoughts are linear.  Does not appear decompensated.  Does not appear internally stimulated.  LYMPH: No adenopathy or splenomegaly. No cervical, supraclavicular or inguinal lymphadenopathy.    DIAGNOSTIC RESULTS   RADIOLOGY:   Non-plain film images such as CT, Ultrasound and MRI are read by the radiologist. Plain radiographic images are visualized and preliminarily interpreted by myself with the below findings: Chest x-ray which revealed mild interstitial edema      Interpretation per the Radiologist below, if available at the time of this note:    CT angio chest for pulmonary embolism   Final Result   1. Marked enlargement of the patient's known abdominal aortic   aneurysm status post aortic stent graft placement, with findings   compatible with endoleak (likely type 3). There is fat stranding   surrounding the aneurysm sac suggesting a component of acute   inflammation. No periaortic hematoma. Vascular surgery evaluation is   recommended.   2. No central pulmonary embolism. Suboptimal opacification of the   distal pulmonary arterial tree as well as motion artifact limits   evaluation of subsegmental pulmonary arteries.   3. Severe  emphysema. Diffuse interstitial prominence, likely   interstitial edema. Small bilateral pleural effusions.   4. Dependent bibasilar airspace opacities, likely hypoventilatory   change. An infectious/inflammatory process could result in a similar   appearance, though is less likely.   5. There is a suspicious new pulmonary nodule along the major fissure   measuring 1.1 cm. Additional new 0.7 cm nodule at the right lung   apex. 3 month follow-up chest CT, PET-CT, or biopsy is recommended.   6. Dilated main pulmonary artery measuring up to 3.7 cm which can be   seen with pulmonary hypertension.   7. Stable ascending thoracic aortic ectasia measuring up to 4.8 cm.   Motion artifact and pulmonary arterial phase contrast bolus timing   limits evaluation of the thoracic aorta.   8. Stable left common iliac artery aneurysm measuring 2.2 cm.        MACRO:   Critical Finding:  See findings. Notification was initiated on   3/17/2025 at 7:36 pm by  Mat Aranda.  (**-YCF-**)   Instructions: Mat rAanda discussed the significance and   urgency of this critical finding by telephone with  Dr. NG On   3/17/2025 at 7:48 pm.  (**-RCF-**) Findings:  See findings.             Signed by: Mat Aranda 3/17/2025 8:04 PM   Dictation workstation:   [id number]      CT abdomen pelvis w IV contrast   Final Result   1. Marked enlargement of the patient's known abdominal aortic   aneurysm status post aortic stent graft placement, with findings   compatible with endoleak (likely type 3). There is fat stranding   surrounding the aneurysm sac suggesting a component of acute   inflammation. No periaortic hematoma. Vascular surgery evaluation is   recommended.   2. No central pulmonary embolism. Suboptimal opacification of the   distal pulmonary arterial tree as well as motion artifact limits   evaluation of subsegmental pulmonary arteries.   3. Severe emphysema. Diffuse interstitial prominence, likely   interstitial  edema. Small bilateral pleural effusions.   4. Dependent bibasilar airspace opacities, likely hypoventilatory   change. An infectious/inflammatory process could result in a similar   appearance, though is less likely.   5. There is a suspicious new pulmonary nodule along the major fissure   measuring 1.1 cm. Additional new 0.7 cm nodule at the right lung   apex. 3 month follow-up chest CT, PET-CT, or biopsy is recommended.   6. Dilated main pulmonary artery measuring up to 3.7 cm which can be   seen with pulmonary hypertension.   7. Stable ascending thoracic aortic ectasia measuring up to 4.8 cm.   Motion artifact and pulmonary arterial phase contrast bolus timing   limits evaluation of the thoracic aorta.   8. Stable left common iliac artery aneurysm measuring 2.2 cm.        MACRO:   Critical Finding:  See findings. Notification was initiated on   3/17/2025 at 7:36 pm by  Mat Aranda.  (**-YCF-**)   Instructions: Mat Aranda discussed the significance and   urgency of this critical finding by telephone with  Dr. NG On   3/17/2025 at 7:48 pm.  (**-RCF-**) Findings:  See findings.             Signed by: Mat Aranda 3/17/2025 8:04 PM   Dictation workstation:   [id number]      XR chest 1 view   Final Result   Mild prominence of interstitial opacities which may relate to chronic   changes and/or mild interstitial edema.        MACRO:   None        Signed by: Kalyan De Leon 3/17/2025 6:33 PM   Dictation workstation:   [id number]            ED BEDSIDE ULTRASOUND:   Performed by myself - none    LABS:  Labs Reviewed   CBC WITH AUTO DIFFERENTIAL - Abnormal       Result Value    WBC 9.2      nRBC 0.0      RBC 3.49 (*)     Hemoglobin 10.6 (*)     Hematocrit 34.3 (*)     MCV 98      MCH 30.4      MCHC 30.9 (*)     RDW 14.0      Platelets 184      Neutrophils % 73.8      Immature Granulocytes %, Automated 0.4      Lymphocytes % 11.6      Monocytes % 11.0      Eosinophils % 3.0      Basophils % 0.2       Neutrophils Absolute 6.80 (*)     Immature Granulocytes Absolute, Automated 0.04      Lymphocytes Absolute 1.07      Monocytes Absolute 1.01 (*)     Eosinophils Absolute 0.28      Basophils Absolute 0.02     COMPREHENSIVE METABOLIC PANEL - Abnormal    Glucose 145 (*)     Sodium 138      Potassium 5.1      Chloride 101      Bicarbonate 32      Anion Gap 10      Urea Nitrogen 32 (*)     Creatinine 1.05      eGFR 67      Calcium 8.9      Albumin 3.6      Alkaline Phosphatase 123      Total Protein 6.6      AST 19      Bilirubin, Total 0.6      ALT 13     TROPONIN I, HIGH SENSITIVITY - Abnormal    Troponin I, High Sensitivity 47 (*)     Narrative:     Less than 99th percentile of normal range cutoff-  Female and children under 18 years old <14 ng/L; Male <21 ng/L: Negative  Repeat testing should be performed if clinically indicated.     Female and children under 18 years old 14-50 ng/L; Male 21-50 ng/L:  Consistent with possible cardiac damage and possible increased clinical   risk. Serial measurements may help to assess extent of myocardial damage.     >50 ng/L: Consistent with cardiac damage, increased clinical risk and  myocardial infarction. Serial measurements may help assess extent of   myocardial damage.      NOTE: Children less than 1 year old may have higher baseline troponin   levels and results should be interpreted in conjunction with the overall   clinical context.     NOTE: Troponin I testing is performed using a different   testing methodology at [facility] than at other   [facility]. Direct result comparisons should only   be made within the same method.   B-TYPE NATRIURETIC PEPTIDE - Abnormal     (*)     Narrative:        <100 pg/mL - Heart failure unlikely  100-299 pg/mL - Intermediate probability of acute heart                  failure exacerbation. Correlate with clinical                  context and patient history.    >=300 pg/mL - Heart Failure likely. Correlate  with clinical                  context and patient history.    BNP testing is performed using different testing methodology at [facility] than at other [facility]. Direct result comparisons should only be made within the same method.      BLOOD GAS VENOUS FULL PANEL - Abnormal    POCT pH, Venous 7.33      POCT pCO2, Venous 59 (*)     POCT pO2, Venous 18 (*)     POCT SO2, Venous 20 (*)     POCT Oxy Hemoglobin, Venous 20.0 (*)     POCT Hematocrit Calculated, Venous 34.0 (*)     POCT Sodium, Venous 137      POCT Potassium, Venous 5.5 (*)     POCT Chloride, Venous 103      POCT Ionized Calicum, Venous 1.17      POCT Glucose, Venous 147 (*)     POCT Lactate, Venous 1.7      POCT Base Excess, Venous 3.9 (*)     POCT HCO3 Calculated, Venous 31.1 (*)     POCT Hemoglobin, Venous 11.2 (*)     POCT Anion Gap, Venous 8.0 (*)     Patient Temperature 37.0      FiO2 60     TROPONIN I, HIGH SENSITIVITY - Abnormal    Troponin I, High Sensitivity 108 (*)     Narrative:     Less than 99th percentile of normal range cutoff-  Female and children under 18 years old <14 ng/L; Male <21 ng/L: Negative  Repeat testing should be performed if clinically indicated.     Female and children under 18 years old 14-50 ng/L; Male 21-50 ng/L:  Consistent with possible cardiac damage and possible increased clinical   risk. Serial measurements may help to assess extent of myocardial damage.     >50 ng/L: Consistent with cardiac damage, increased clinical risk and  myocardial infarction. Serial measurements may help assess extent of   myocardial damage.      NOTE: Children less than 1 year old may have higher baseline troponin   levels and results should be interpreted in conjunction with the overall   clinical context.     NOTE: Troponin I testing is performed using a different   testing methodology at [facility] than at other   [facility]. Direct result comparisons should only   be made within the same method.    MAGNESIUM - Normal    Magnesium 2.34     PROTIME-INR - Normal    Protime 11.7      INR 1.1     APTT - Normal    aPTT 29      Narrative:     The APTT is no longer used for monitoring Unfractionated Heparin Therapy. For monitoring Heparin Therapy, use the Heparin Assay.   LIPASE - Normal    Lipase 26      Narrative:     Venipuncture immediately after or during the administration of Metamizole may lead to falsely low results. Testing should be performed immediately prior to Metamizole dosing.   LACTATE - Normal    Lactate 1.6      Narrative:     Venipuncture immediately after or during the administration of Metamizole may lead to falsely low results. Testing should be performed immediately prior to Metamizole dosing.   INFLUENZA A AND B PCR - Normal    Flu A Result Not Detected      Flu B Result Not Detected      Narrative:     This assay is an in vitro diagnostic multiplex nucleic acid amplification test for the detection and discrimination of Influenza A & B from nasopharyngeal specimens, and has been validated for use at [facility]. Negative results do not preclude Influenza A/B infections, and should not be used as the sole basis for diagnosis, treatment, or other management decisions. If Influenza A/B and RSV PCR results are negative, testing for Parainfluenza virus, Adenovirus and Metapneumovirus is routinely performed for [facility] pediatric oncology and intensive care inpatients, and is available on other patients by placing an add-on request.   SARS-COV-2 PCR - Normal    Coronavirus 2019, PCR Not Detected      Narrative:     This assay is an FDA-cleared, in vitro diagnostic nucleic acid amplification test for the qualitative detection and differentiation of SARS CoV-2 from nasopharyngeal specimens collected from individuals with signs and symptoms of respiratory tract infections, and has been validated for use at [facility]. Negative results do not preclude COVID-19  infections and should not be used as the sole basis for diagnosis, treatment, or other management decisions. Testing for SARS CoV-2 is recommended only for patients who meet current clinical and/or epidemiological criteria defined by federal, state, or local public health directives.   TYPE AND SCREEN    ABO TYPE A      Rh TYPE POS      ANTIBODY SCREEN NEG     URINALYSIS WITH REFLEX CULTURE AND MICROSCOPIC    Narrative:     The following orders were created for panel order Urinalysis with Reflex Culture and Microscopic.  Procedure                               Abnormality         Status                     ---------                               -----------         ------                     Urinalysis with Reflex C...[[id number]]                                                 Extra Urine Gray Tube[[id number]]                                                         Please view results for these tests on the individual orders.   URINALYSIS WITH REFLEX CULTURE AND MICROSCOPIC   EXTRA URINE GRAY TUBE       All other labs were within normal range or not returned as of this dictation.    EMERGENCY DEPARTMENT COURSE/MDM:   Vitals:    Vitals:    03/17/25 2016 03/17/25 2025 03/17/25 2028 03/17/25 2030   BP: 116/63 105/56 109/58 112/55   BP Location:       Patient Position:       Pulse: 99 97 96 96   Resp: (!) 26 (!) 24 (!) 26 (!) 24   Temp:       SpO2: 99% 100% 99% 98%   Weight:       Height:           I reviewed the patient's triage vitals.    This is a 89-year-old male presents ED for evaluation abdominal pain and shortness of breath.  He is mentating appropriately and neurologically intact but mildly dyspneic with speaking and exertion.  He is able to speak in complete sentences.  He is placed on continuous cardiac monitor and pulse oximetry.  He is noted to be saturating at 85% on a nonrebreather and respiratory therapy is called bedside and places patient on high flow with improvement of his oxygen saturation and work  of breathing.  There is no evidence of trauma noted, no overt evidence of fluid overload except for possibly in his abdomen which is distended, soft with generalized tenderness to palpation.  There is no peritonitis at this time.  He will be given medications for his symptoms.    Workup was reviewed.  Initial troponin 47, elevated to 108 on repeat, likely demand in nature, no indication for heparin drip at this time in edition CT scan concerning for leaking AAA with prior graft which patient states he had placed 11 years ago at [facility] but his doctor is no longer there.  He had a mildly elevated BNP of 939 with some pulmonary edema which could be attributed to his mild respiratory distress.  There is no noted drop in his hemoglobin.    Of note:  There is a suspicious new pulmonary nodule along the major fissure  measuring 1.1 cm. Additional new 0.7 cm nodule at the right lung  apex. 3 month follow-up chest CT, PET-CT, or biopsy is recommended.    Patient was notified of incidental finding.    Due to leaking AAA, my ED attending did speak with vascular surgery Dr. Bay who recommended transfer.  Patient is requesting be transferred to [facility] as he had his prior surgeries there and his insurance will cover there.  Therefore my ED attending spoke with the transfer center who accepted patient to [facility] for further management.  Accepted by Dr. Bee.    Of note patient did state that he did not like the high flow nasal cannula as he does not like the heat and is requesting to be taken off of it and placed back on the nonrebreather or the BiPAP.  He did have noted tachypnea off of it and while on the nonrebreather did have hypoxia.  Therefore we placed the patient on BiPAP for further respiratory support with improvement of his respiratory status.  We initially held off on BiPAP due to concern for intra-abdominal pressure pressure in the setting of AAA.  He was initially tachycardic in  A-fib RVR with blood pressures around 130 systolic, therefore he was initiated on esmolol drip for further management with improvement.  He remained otherwise he medically stable on esmolol drip and BiPAP in guarded condition throughout my ED course of care.  There is no indication for intubation at this time with his improved respiratory status on BiPAP and he is still mentating appropriately and speaking complete sentences.  He was transferred to [facility] for further and definitive management via CCT.      ED Course as of 03/17/25 2049   Mon Mar 17, 2025   1916 Consulting vascular surgery concern for leak AAA [SE]   1923 Spoke with Dr. Bay she is requesting a few minutes to review CT imaging [SE]   1943 Dr. Bay recommending transfer  [SE]   2003 Accepted by Dr. Bee to [facility].  Auto launch.  Patient not tolerating nasal cannula will try BiPAP.  Case was also discussed with the radiologist who agrees with interpretation of endoleak with expansion of the aneurysmal sac and fat stranding suggesting acute process. [SE]      ED Course User Index  [SE] Galileo Cooper MD         Diagnoses as of 03/17/25 2049   Shortness of breath   Generalized abdominal pain   Abdominal aortic aneurysm (AAA) without rupture, unspecified part (CMS-HCC)   COPD exacerbation (Multi)   Elevated troponin   Type III endoleak of aortic graft (CMS-[facility])       Patient/family members at bedside were counseled regarding labs, imaging, likely diagnosis, and plan. All questions were answered.     ------------------------------------------------------------------  EKG Interpretation: A-fib at a rate of 118, , QTc 480 without evidence of STEMI or ischemia    Differential Diagnoses Considered: Heart failure exacerbation, fluid overload, pneumothorax, pneumonia, COVID, influenza, PE, UTI, acute intra-abdominal process, ACS, ruptured AAA, leaking AAA, COPD exacerbation    Chronic Medical Conditions Significantly  Affecting Care: Heart failure/COPD    External Records Reviewed: I reviewed recent and relevant outside records including: PCP notes, prior discharge summary, previous radiologic studies, HIE    Escalation of Care:  Appropriate for answered to [facility] for further and definitive management    Social Determinants of Health Significantly Affecting Care:  None    Prescription Drug Consideration: N/A    Diagnostic testing considered: No additional indicated at this time    Discussion of Management with Other Providers:   I discussed the patient/results with: [facility], [facility] Dr. Phu Choen vascular surgery      ------------------------------------------------------------------  ED Medications administered this visit:    Medications   oxygen (O2) therapy (60 percent inhalation Start 3/17/25 1947)   esmolol (Brevibloc) 2500 mg in sodium chloride 250 mL (10 mg/mL) infusion (premix) (250 mcg/kg/min × 89.4 kg intravenous New Bag 3/17/25 2046)   oxygen (O2) therapy (has no administration in time range)   ipratropium-albuteroL (Duo-Neb) 0.5-2.5 mg/3 mL nebulizer solution 3 mL (3 mL nebulization Given 3/17/25 1750)   methylPREDNISolone sod succinate (SOLU-Medrol) injection 125 mg (125 mg intravenous Given 3/17/25 1806)   morphine injection 4 mg (4 mg intravenous Given 3/17/25 1926)   iohexol (OMNIPaque) 350 mg iodine/mL solution 80 mL (80 mL intravenous Given 3/17/25 1908)       New Prescriptions from this visit:    New Prescriptions    No medications on file       Follow-up:  No follow-up provider specified.      Final Impression:   1. Shortness of breath    2. Generalized abdominal pain    3. Abdominal aortic aneurysm (AAA) without rupture, unspecified part (CMS-HCC)    4. COPD exacerbation (Multi)    5. Elevated troponin    6. 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 Cooper to review the plan of care during ED  course.    (Please note that portions of this note were completed with a voice recognition program.  Efforts were made to edit the dictations but occasionally words are mis-transcribed.)     Flavia Stanford, NATHAN-CESAR  03/17/25 2050

## 2025-03-17 NOTE — PROGRESS NOTES
Pharmacy Medication History Review    Donnie Sims is a 89 y.o. male admitted for No Principal Problem: There is no principal problem currently on the Problem List. Please update the Problem List and refresh.. Pharmacy reviewed the patient's [id number] medications and allergies for accuracy.    The list below reflectives the updated PTA list. Please review each medication in order reconciliation for additional clarification and justification.  Prior to Admission medications    Medication Sig Start Date End Date Authorizing Provider   allopurinol (Zyloprim) 300 mg tablet Take 1 tablet (300 mg) by mouth once daily.   Historical Provider, MD   aspirin 81 mg EC tablet Take 1 tablet (81 mg) by mouth once daily.   Historical Provider, MD   atorvastatin (Lipitor) 80 mg tablet Take 0.5 tablets (40 mg) by mouth once daily at bedtime.   Historical Provider, MD   budesonide (Pulmicort) 0.5 mg/2 mL nebulizer solution Take 2 mL (0.5 mg) by nebulization 2 times a day.   Historical Provider, MD   cholecalciferol (Vitamin D-3) 50 MCG (2000 UT) tablet Take 1 tablet (50 mcg) by mouth once daily.   Historical Provider, MD   Eucerin cream Apply 1 Application topically once daily. Apply to bilateral feet   Historical Provider, MD   guaiFENesin (Humibid 3) 400 mg tablet Take 1 tablet (400 mg) by mouth 2 times a day.   Historical Provider, MD   metoprolol tartrate (Lopressor) 25 mg tablet Take 1 tablet (25 mg) by mouth 2 times a day.   Historical Provider, MD   oxygen (O2) gas therapy Inhale 3.5 L/min continuously.   Historical Provider, MD   tiotropium-olodateroL (Stiolto Respimat) 2.5-2.5 mcg/actuation mist inhaler Inhale 2 Inhalations once daily.   Historical Provider, MD   acetaminophen (Tylenol) 325 mg tablet Take 2 tablets (650 mg) by mouth every 4 hours if needed for mild pain (1 - 3).   Rick Junior MD   albuterol 90 mcg/actuation inhaler Inhale 2 puffs every 6 hours if needed for wheezing or shortness of breath.    Historical Provider, MD   torsemide (Demadex) 20 mg tablet Take 1-2 tablets (20-40 mg) by mouth once daily as needed (1 tab at 211 lbs / 2 tabs >214 lbs).   Historical Provider, MD        The list below reflectives the updated allergy list. Please review each documented allergy for additional clarification and justification.  Allergies  Reviewed by Sarah Ji RN on 3/17/2025   No Known Allergies         Below are additional concerns with the patient's PTA list.      Sarah Crow

## 2025-03-18 NOTE — ED PROCEDURE NOTE
Procedure  Critical Care    Performed by: URIEL Powell  Authorized by: Galileo Cooper MD    Critical care provider statement:     Critical care time (minutes):  75    Critical care time was exclusive of:  Separately billable procedures and treating other patients and teaching time    Critical care was necessary to treat or prevent imminent or life-threatening deterioration of the following conditions:  Respiratory failure and circulatory failure    Critical care was time spent personally by me on the following activities:  Blood draw for specimens, ordering and performing treatments and interventions, development of treatment plan with patient or surrogate, ordering and review of laboratory studies, discussions with consultants, ordering and review of radiographic studies, discussions with primary provider, pulse oximetry, evaluation of patient's response to treatment, re-evaluation of patient's condition, examination of patient, review of old charts and obtaining history from patient or surrogate    Care discussed with: accepting provider at another facility                 URIEL Powell  03/17/25 2050